# Patient Record
Sex: MALE | Race: AMERICAN INDIAN OR ALASKA NATIVE
[De-identification: names, ages, dates, MRNs, and addresses within clinical notes are randomized per-mention and may not be internally consistent; named-entity substitution may affect disease eponyms.]

---

## 2021-04-22 ENCOUNTER — HOSPITAL ENCOUNTER (INPATIENT)
Dept: HOSPITAL 5 - ED | Age: 21
LOS: 3 days | Discharge: HOME | DRG: 391 | End: 2021-04-25
Attending: INTERNAL MEDICINE | Admitting: HOSPITALIST
Payer: COMMERCIAL

## 2021-04-22 DIAGNOSIS — K52.9: Primary | ICD-10-CM

## 2021-04-22 DIAGNOSIS — N17.9: ICD-10-CM

## 2021-04-22 DIAGNOSIS — Z23: ICD-10-CM

## 2021-04-22 DIAGNOSIS — N18.9: ICD-10-CM

## 2021-04-22 DIAGNOSIS — E87.5: ICD-10-CM

## 2021-04-22 DIAGNOSIS — E10.10: ICD-10-CM

## 2021-04-22 DIAGNOSIS — E87.1: ICD-10-CM

## 2021-04-22 DIAGNOSIS — E86.9: ICD-10-CM

## 2021-04-22 DIAGNOSIS — E86.0: ICD-10-CM

## 2021-04-22 DIAGNOSIS — K29.00: ICD-10-CM

## 2021-04-22 LAB
ALBUMIN SERPL-MCNC: 4.7 G/DL (ref 3.9–5)
ALT SERPL-CCNC: 17 UNITS/L (ref 7–56)
BASOPHILS # (AUTO): 0.1 K/MM3 (ref 0–0.1)
BASOPHILS NFR BLD AUTO: 0.9 % (ref 0–1.8)
BILIRUB UR QL STRIP: (no result)
BLOOD UR QL VISUAL: (no result)
BUN SERPL-MCNC: 21 MG/DL (ref 9–20)
BUN SERPL-MCNC: 22 MG/DL (ref 9–20)
BUN/CREAT SERPL: 12 %
BUN/CREAT SERPL: 12 %
CALCIUM SERPL-MCNC: 10 MG/DL (ref 8.4–10.2)
CALCIUM SERPL-MCNC: 9.9 MG/DL (ref 8.4–10.2)
EOSINOPHIL # BLD AUTO: 0 K/MM3 (ref 0–0.4)
EOSINOPHIL NFR BLD AUTO: 0 % (ref 0–4.3)
HCT VFR BLD CALC: 48.4 % (ref 35.5–45.6)
HEMOLYSIS INDEX: 21
HEMOLYSIS INDEX: 24
HGB BLD-MCNC: 15 GM/DL (ref 11.8–15.2)
LYMPHOCYTES # BLD AUTO: 1.2 K/MM3 (ref 1.2–5.4)
LYMPHOCYTES NFR BLD AUTO: 10.6 % (ref 13.4–35)
MCHC RBC AUTO-ENTMCNC: 31 % (ref 32–34)
MCV RBC AUTO: 86 FL (ref 84–94)
MONOCYTES # (AUTO): 0.3 K/MM3 (ref 0–0.8)
MONOCYTES % (AUTO): 2.9 % (ref 0–7.3)
MUCOUS THREADS #/AREA URNS HPF: (no result) /HPF
PH UR STRIP: 5 [PH] (ref 5–7)
PLATELET # BLD: 215 K/MM3 (ref 140–440)
RBC # BLD AUTO: 5.61 M/MM3 (ref 3.65–5.03)
RBC #/AREA URNS HPF: 1 /HPF (ref 0–6)
UROBILINOGEN UR-MCNC: < 2 MG/DL (ref ?–2)
WBC #/AREA URNS HPF: 1 /HPF (ref 0–6)

## 2021-04-22 PROCEDURE — 96375 TX/PRO/DX INJ NEW DRUG ADDON: CPT

## 2021-04-22 PROCEDURE — 90686 IIV4 VACC NO PRSV 0.5 ML IM: CPT

## 2021-04-22 PROCEDURE — 82962 GLUCOSE BLOOD TEST: CPT

## 2021-04-22 PROCEDURE — 81001 URINALYSIS AUTO W/SCOPE: CPT

## 2021-04-22 PROCEDURE — 84100 ASSAY OF PHOSPHORUS: CPT

## 2021-04-22 PROCEDURE — 83690 ASSAY OF LIPASE: CPT

## 2021-04-22 PROCEDURE — 96368 THER/DIAG CONCURRENT INF: CPT

## 2021-04-22 PROCEDURE — 96376 TX/PRO/DX INJ SAME DRUG ADON: CPT

## 2021-04-22 PROCEDURE — 96372 THER/PROPH/DIAG INJ SC/IM: CPT

## 2021-04-22 PROCEDURE — 83735 ASSAY OF MAGNESIUM: CPT

## 2021-04-22 PROCEDURE — 90732 PPSV23 VACC 2 YRS+ SUBQ/IM: CPT

## 2021-04-22 PROCEDURE — 96361 HYDRATE IV INFUSION ADD-ON: CPT

## 2021-04-22 PROCEDURE — 36415 COLL VENOUS BLD VENIPUNCTURE: CPT

## 2021-04-22 PROCEDURE — 80048 BASIC METABOLIC PNL TOTAL CA: CPT

## 2021-04-22 PROCEDURE — 96366 THER/PROPH/DIAG IV INF ADDON: CPT

## 2021-04-22 PROCEDURE — 96367 TX/PROPH/DG ADDL SEQ IV INF: CPT

## 2021-04-22 PROCEDURE — 83036 HEMOGLOBIN GLYCOSYLATED A1C: CPT

## 2021-04-22 PROCEDURE — 80053 COMPREHEN METABOLIC PANEL: CPT

## 2021-04-22 PROCEDURE — 96365 THER/PROPH/DIAG IV INF INIT: CPT

## 2021-04-22 PROCEDURE — 85025 COMPLETE CBC W/AUTO DIFF WBC: CPT

## 2021-04-22 PROCEDURE — 74177 CT ABD & PELVIS W/CONTRAST: CPT

## 2021-04-22 NOTE — EMERGENCY DEPARTMENT REPORT
ED General Adult HPI





- General


Chief complaint: Hyperglycemia


Stated complaint: DIABETIC/WEAKNESS/DIZZINESS


PUI?: No


Time Seen by Provider: 04/22/21 22:40


Source: patient


Mode of arrival: Ambulatory


Limitations: No Limitations





- History of Present Illness


Initial comments: 





Patient is a 21-year-old male who presents emergency room with complaints of 

elevated blood sugars, abdominal pain, vomiting, dizziness and lightheadedness 

and weakness.  Patient states his sugar "past 2 weeks.  Patient dates he is a 

type I diabetic.  He states he is compliant with his insulin.  Patient states 

that approximately 3 days ago he developed abdominal pain in the epigastric 

region.  Patient states he then started vomiting.  Patient states he not been 

able to hold anything down.  Patient states he is been using ice chips for 

hydration.  Patient states that his dizziness and lightheadedness started today.

 Patient states his symptoms are better with rest and worse with movement and 

vomiting.  Patient denies chest pain.  Patient denies fever.  Patient denies 

shortness of breath.  Patient states the abdominal pain is a 10 out of 10.  Pat

ient states his better with rest and worse with movement and vomiting.  Patient 

denies blood in his vomitus.








Patient denies recent travel.  Patient denies recent international travel.  

Patient denies exposure to the novel coronavirus.  Patient denies sick contacts.

 Patient denies fever and chills.  Patient denies cough.  Patient denies 

diarrhea.  Patient denies coming in contact with anybody with symptoms of the 

novel coronavirus.








-: Sudden


Location: abdomen


Severity scale (0 -10): 10


Quality: stabbing


Consistency: constant


Improves with: rest


Worsens with: movement, other


Associated Symptoms: loss of appetite, malaise, nausea/vomiting, weakness.  

denies: confusion, chest pain, cough, diaphoresis, fever/chills, headaches, 

rash, seizure


Treatments Prior to Arrival: none





- Related Data


                                Home Medications











 Medication  Instructions  Recorded  Confirmed  Last Taken


 


Insulin Lispro [HumaLOG VIAL] 0 units SQ AC 02/13/17 02/13/17 02/13/17








                                  Previous Rx's











 Medication  Instructions  Recorded  Last Taken  Type


 


Insulin Detemir [Levemir VIAL] 25 units SQ DAILY #10 unit 12/08/20 Unknown Rx











                                    Allergies











Allergy/AdvReac Type Severity Reaction Status Date / Time


 


No Known Allergies Allergy   Unverified 02/13/17 08:22














ED Review of Systems


ROS: 


Stated complaint: DIABETIC/WEAKNESS/DIZZINESS


Other details as noted in HPI





Constitutional: see HPI, malaise, weakness.  denies: chills, fever


Eyes: denies: eye pain, eye discharge, vision change


ENT: denies: ear pain, throat pain


Respiratory: denies: cough, shortness of breath, wheezing


Cardiovascular: denies: chest pain, palpitations


Endocrine: no symptoms reported


Gastrointestinal: as per HPI, abdominal pain, nausea, vomiting.  denies: 

diarrhea


Genitourinary: denies: urgency, dysuria


Musculoskeletal: denies: back pain, joint swelling, arthralgia


Skin: denies: rash, lesions


Neurological: denies: headache, weakness, paresthesias


Psychiatric: denies: anxiety, depression


Hematological/Lymphatic: denies: easy bleeding, easy bruising





ED Past Medical Hx





- Past Medical History


Previous Medical History?: Yes


Hx Congestive Heart Failure: No


Hx Diabetes: Yes (Since age 8)


Hx Asthma: No


Hx COPD: No


Additional medical history: Boil





- Surgical History


Past Surgical History?: No





- Family History


Family history: no significant





- Social History


Smoking Status: Never Smoker


Substance Use Type: None





- Medications


Home Medications: 


                                Home Medications











 Medication  Instructions  Recorded  Confirmed  Last Taken  Type


 


Insulin Lispro [HumaLOG VIAL] 0 units SQ AC 02/13/17 02/13/17 02/13/17 History


 


Insulin Detemir [Levemir VIAL] 25 units SQ DAILY #10 unit 12/08/20  Unknown Rx














ED Physical Exam





- General


Limitations: No Limitations


General appearance: alert, in no apparent distress





- Head


Head exam: Present: atraumatic, normocephalic





- Eye


Eye exam: Present: normal appearance





- ENT


ENT exam: Present: mucous membranes dry





- Neck


Neck exam: Present: normal inspection





- Respiratory


Respiratory exam: Present: normal lung sounds bilaterally.  Absent: respiratory 

distress, wheezes, rales





- Cardiovascular


Cardiovascular Exam: Present: regular rate, normal rhythm.  Absent: systolic 

murmur, diastolic murmur, rubs, gallop





- GI/Abdominal


GI/Abdominal exam: Present: soft, tenderness (Generalized tenderness), normal 

bowel sounds





- Rectal


Rectal exam: Present: deferred





- Extremities Exam


Extremities exam: Present: normal inspection





- Back Exam


Back exam: Present: normal inspection





- Neurological Exam


Neurological exam: Present: alert, oriented X3





- Psychiatric


Psychiatric exam: Present: normal affect, normal mood





- Skin


Skin exam: Present: warm, dry, intact, normal color.  Absent: rash





ED Course


                                   Vital Signs











  04/22/21 04/22/21





  21:45 23:15


 


Temperature 97.7 F 


 


Pulse Rate 108 H 


 


Respiratory 18 24





Rate  


 


Blood Pressure 146/85 


 


O2 Sat by Pulse 100 





Oximetry  














- Reevaluation(s)


Reevaluation #1: 


Patient is severely tender on palpation of the abdomen patient will have a CT 

scan of the abdomen with IV contrast.  Patient agrees with plan of care.  

Patient also found to be in DKA and will be placed on DKA protocol to include 

fluids and IV insulin.


04/22/21 22:56











Reevaluation #2: 


Patient states he is feeling a little bit better.  Patient states his dizziness 

is better.


04/23/21 00:02





Reevaluation #3: 


I discussed all results with patient.  I discussed plan of care with patient.  

Patient agrees with plan of care and admission.  Patient to be admitted to the 

hospitalist service.


04/23/21 00:43








- Consultations


Consultation #1: 


Hospitalist consulted for admission.  Hospitalist to admit patient.


04/23/21 00:43








ED Medical Decision Making





- Lab Data


Result diagrams: 


                                 04/22/21 21:51





                                 04/22/21 23:05





- Radiology Data


Radiology results: report reviewed





 


CT ABDOMEN AND PELVIS WITH CONTRAST  


 


 HISTORY: Pt complains of epigastric abd pain w/ weakness x 3 days. High WBC.  


 


 COMPARISON: None.  


 


 TECHNIQUE: CT images of the abdomen and pelvis were obtained following admini

stration of 


intravenous contrast.  All CT scans at this location are performed using CT dose

reduction for ALARA


by means of automated exposure control.   


 


 CONTRAST: 100 ml of intravenous contrast administered.  


 


 FINDINGS:  


 


 Lungs/bones:  Lung bases are clear. No acute osseous abnormality identified.  


 


 Abdomen/pelvis:  The liver, gallbladder, spleen, pancreas, adrenals, kidneys, 

and proximal GI tract


appear unremarkable.  


 


 Prostate is normal with no pelvic free fluid. The appendix is not clearly 

identified on this exam.  


 


 Urinary bladder is distended with no obvious wall thickening, stone disease, or

mass.  


 


 There are areas of the colon which demonstrate questionable mild wall 

thickening (for example the 


proximal and transverse colon). No definite inflammatory stranding and no bowel 

obstruction.  


 


 


 IMPRESSION:  


 1. Question abnormal appearance of the colon. Correlate for potential 

early/mild colitis.  


 2. Distended urinary bladder of uncertain clinical significance with no stone 

disease, wall 


thickening or mass.  


=========================================================================








- Medical Decision Making





Patient is a 21-year-old male presents emergency room with complaints of 

dizziness, lightheadedness, abdominal pain, nausea, vomiting, elevated blood 

sugar.  Patient had labs done which are consistent with renal insufficiency and 

DKA.  Patient had a CT scan of the abdomen to rule out acute abdominal process 

and it was negative for acute findings of her mild colitis.  Patient started on 

DKA protocol immediately after initial evaluation to include insulin drip and 

fluids.  Patient admitted to the hospital service for further evaluation 

treatment.





Critical care time documented due to the multiple reassessments, prolonged time 

at the bedside, interpretation of diagnostics and labs.











- Differential Diagnosis


Abdominal pain, gastritis, colitis, DKA, hyperglycemia


Critical Care Time: Yes


Critical care time in (mins) excluding proc time.: 35


Critical care attestation.: 


If time is entered above; I have spent that time in minutes in the direct care 

of this critically ill patient, excluding procedure time.





Critical Care Time: 





35 MINUTES








ED Disposition


Clinical Impression: 


 Dehydration, Hyponatremia, Hyperkalemia, Dizziness, Colitis, Renal 

insufficiency





DKA (diabetic ketoacidoses)


Qualifiers:


 Diabetes mellitus type: type 1 Diabetes mellitus complication detail: without 

coma Qualified Code(s): E10.10 - Type 1 diabetes mellitus with ketoacidosis 

without coma





Abdominal pain


Qualifiers:


 Abdominal location: epigastric Qualified Code(s): R10.13 - Epigastric pain





Nausea & vomiting


Qualifiers:


 Vomiting type: unspecified Vomiting Intractability: non-intractable Qualified 

Code(s): R11.2 - Nausea with vomiting, unspecified





Gastritis


Qualifiers:


 Gastritis type: unspecified gastritis Chronicity: acute Gastritis bleeding: 

without bleeding Qualified Code(s): K29.00 - Acute gastritis without bleeding





Disposition: DC-09 OP ADMIT IP TO THIS HOSP


Is pt being admited?: Yes


Does the pt Need Aspirin: No


Condition: Critical


Instructions:  Diabetic Ketoacidosis (ED)


Time of Disposition: 00:42

## 2021-04-23 LAB
BUN SERPL-MCNC: 13 MG/DL (ref 9–20)
BUN SERPL-MCNC: 13 MG/DL (ref 9–20)
BUN SERPL-MCNC: 14 MG/DL (ref 9–20)
BUN SERPL-MCNC: 16 MG/DL (ref 9–20)
BUN SERPL-MCNC: 17 MG/DL (ref 9–20)
BUN SERPL-MCNC: 18 MG/DL (ref 9–20)
BUN SERPL-MCNC: 20 MG/DL (ref 9–20)
BUN SERPL-MCNC: 21 MG/DL (ref 9–20)
BUN/CREAT SERPL: 10 %
BUN/CREAT SERPL: 11 %
BUN/CREAT SERPL: 12 %
BUN/CREAT SERPL: 12 %
BUN/CREAT SERPL: 8 %
BUN/CREAT SERPL: 8 %
BUN/CREAT SERPL: 9 %
BUN/CREAT SERPL: 9 %
CALCIUM SERPL-MCNC: 8.1 MG/DL (ref 8.4–10.2)
CALCIUM SERPL-MCNC: 8.2 MG/DL (ref 8.4–10.2)
CALCIUM SERPL-MCNC: 8.3 MG/DL (ref 8.4–10.2)
CALCIUM SERPL-MCNC: 8.3 MG/DL (ref 8.4–10.2)
CALCIUM SERPL-MCNC: 8.6 MG/DL (ref 8.4–10.2)
CALCIUM SERPL-MCNC: 8.7 MG/DL (ref 8.4–10.2)
CALCIUM SERPL-MCNC: 8.8 MG/DL (ref 8.4–10.2)
CALCIUM SERPL-MCNC: 9.2 MG/DL (ref 8.4–10.2)
HEMOLYSIS INDEX: 12
HEMOLYSIS INDEX: 154
HEMOLYSIS INDEX: 23
HEMOLYSIS INDEX: 47
HEMOLYSIS INDEX: 51
HEMOLYSIS INDEX: 66
HEMOLYSIS INDEX: 8
HEMOLYSIS INDEX: 8

## 2021-04-23 RX ADMIN — PANTOPRAZOLE SODIUM SCH MG: 40 TABLET, DELAYED RELEASE ORAL at 08:02

## 2021-04-23 RX ADMIN — HEPARIN SODIUM SCH: 5000 INJECTION, SOLUTION INTRAVENOUS; SUBCUTANEOUS at 06:49

## 2021-04-23 RX ADMIN — DEXTROSE, SODIUM CHLORIDE, AND POTASSIUM CHLORIDE SCH MLS/HR: 5; .45; .15 INJECTION INTRAVENOUS at 06:49

## 2021-04-23 RX ADMIN — HEPARIN SODIUM SCH: 5000 INJECTION, SOLUTION INTRAVENOUS; SUBCUTANEOUS at 20:06

## 2021-04-23 RX ADMIN — DEXTROSE, SODIUM CHLORIDE, AND POTASSIUM CHLORIDE SCH MLS/HR: 5; .45; .15 INJECTION INTRAVENOUS at 17:05

## 2021-04-23 RX ADMIN — PIPERACILLIN AND TAZOBACTAM SCH MLS/HR: 4; .5 INJECTION, POWDER, FOR SOLUTION INTRAVENOUS at 04:03

## 2021-04-23 RX ADMIN — PIPERACILLIN AND TAZOBACTAM SCH MLS/HR: 4; .5 INJECTION, POWDER, FOR SOLUTION INTRAVENOUS at 13:06

## 2021-04-23 RX ADMIN — PIPERACILLIN AND TAZOBACTAM SCH MLS/HR: 4; .5 INJECTION, POWDER, FOR SOLUTION INTRAVENOUS at 19:55

## 2021-04-23 NOTE — CONSULTATION
History of Present Illness





- Reason for Consult


Consult date: 04/23/21


acute renal failure, metabolic acidosis





- History of Present Illness








Mr. Mckee is a 20yo with type I DM dx age 7 who presented to the ED with 

elevated blood sugars.  He reports a 1 day history of nausea, vomiting.  However

per records, patient reported symptoms have been ongoing x 2 weeks.





He denies fevers, chills.  He denies cough, SOB.





In the emergency room patient is found blood glucose is 643 bicarb is 7 and a

nion gap is 47.  





Nephrology consulted due to persistent metabolic acidosis.   Patient denies a 

prior history of kidney disease








Past History


Past Medical History: diabetes





Medications and Allergies


                                    Allergies











Allergy/AdvReac Type Severity Reaction Status Date / Time


 


No Known Allergies Allergy   Unverified 02/13/17 08:22











                                Home Medications











 Medication  Instructions  Recorded  Confirmed  Last Taken  Type


 


Insulin Lispro [HumaLOG VIAL] 0 units SQ AC 02/13/17 02/13/17 02/13/17 History


 


Insulin Detemir [Levemir VIAL] 25 units SQ DAILY #10 unit 12/08/20  Unknown Rx











Active Meds: 


Active Medications





Acetaminophen (Acetaminophen 325 Mg Tab)  650 mg PO Q6H PRN


   PRN Reason: Pain, Mild (1-3)


Dextrose (Dextrose 50% In Water (25gm) 50 Ml Syringe)  50 ml IV Q30MIN PRN; 

Protocol


   PRN Reason: Hypoglycemia


Heparin Sodium (Porcine) (Heparin 5,000 Unit/1 Ml Vial)  5,000 unit SUB-Q Q8HR 

NATY


   Last Admin: 04/23/21 06:49 Dose:  Not Given


   Documented by: 


Hydralazine HCl (Hydralazine 20 Mg/1 Ml Inj)  10 mg IV Q6H PRN


   PRN Reason: htn


Potassium Chloride/Dextrose/Sod Cl (D5w/0.45% Nacl/Kcl 20 Meq)  20 meq in 1,000 

mls @ 125 mls/hr IV AS DIRECT NATY


   Last Admin: 04/23/21 06:49 Dose:  125 mls/hr


   Documented by: 


Insulin Human Regular 100 (units/ Sodium Chloride)  100 mls @ 1 mls/hr IV TITR 

NATY; Protocol


Piperacillin Sod/Tazobactam Sod (Zosyn/Ns 4.5gm/100ml)  4.5 gm in 100 mls @ 200 

mls/hr IV Q8H NATY; Protocol


   Last Admin: 04/23/21 13:06 Dose:  200 mls/hr


   Documented by: 


Lactated Ringer's (Lactated Ringers)  1,000 mls @ 200 mls/hr IV AS DIRECT NATY


Morphine Sulfate (Morphine 2 Mg/1 Ml Inj)  2 mg IV Q4H PRN


   PRN Reason: Pain , Severe (7-10)


Ondansetron HCl (Ondansetron 4 Mg/2 Ml Inj)  4 mg IV Q8H PRN


   PRN Reason: Nausea And Vomiting


Pantoprazole Sodium (Pantoprazole 40 Mg Tab)  40 mg PO QDAC NATY


   Last Admin: 04/23/21 08:02 Dose:  40 mg


   Documented by: 


Sodium Chloride (Sodium Chloride 0.9% 10 Ml Flush Syringe)  10 ml IV PRN PRN


   PRN Reason: LINE FLUSH











Review of Systems


All systems: negative





Exam





- Vital Signs


Vital signs: 


                                   Vital Signs











Temp Pulse Resp BP Pulse Ox


 


 97.7 F   108 H  18   146/85   100 


 


 04/22/21 21:45  04/22/21 21:45  04/22/21 21:45  04/22/21 21:45  04/22/21 21:45














- General Appearance


General appearance: well-developed, well-nourished


EENT: ATNC


Heart: regular, S1S2


Gastrointestinal: Present: normal.  Absent: tenderness, distended


Integumentary: warm and dry


Neurologic: no focal deficit





Results





- Lab Results





                                 04/22/21 21:51





                                 04/23/21 15:43


                             Most recent lab results











Calcium  8.2 mg/dL (8.4-10.2)  L  04/23/21  13:02    


 


Phosphorus  4.30 mg/dL (2.5-4.5)  D 04/23/21  02:45    


 


Magnesium  2.50 mg/dL (1.7-2.3)  H  04/23/21  02:45    














Assessment and Plan





Impression


* Acute kidney injury secondary to volume depletion


   --SCr 0.6mg/dL in Dec 2020


* Anion gap metabolic acidosis secondary to DKA


* DKA


* Type I DM


* Colitis





Plan:


* Case discussed with hospitalist who reported insulin gtt was discontinued by 

  RN due to hypoglycemia.  However, anion gap had not corrected.  Insulin gtt 

  has since been resumed


* Continue IVF for hydration


* Glycemic control per primary team


* Abx per primary team


* Dose medications for renal function


* Avoid potential nephrotoxins


* AM labs

## 2021-04-23 NOTE — HISTORY AND PHYSICAL REPORT
History of Present Illness


Date of examination: 04/23/21


Date of admission: 


04/23/21 00:44





Chief complaint: 





Weakness dizziness hyperglycemia


History of present illness: 





21-year-old male with history of type 1 diabetes was brought to the emergency 

room with complaints of elevated blood sugars, abdominal pain, vomiting, 

dizziness and lightheadedness and weakness for the past 2 weeks.  Patient is 

compliant with his insulin.  Patient states that approximately 3 days ago he 

developed abdominal pain in the epigastric region.  Patient states he then 

started vomiting.  Patient states he not been able to hold anything down.  

Patient states he is been using ice chips for hydration.  Patient states that 

his dizziness and lightheadedness started today.  Patient states his symptoms 

are better with rest and worse with movement and vomiting.  Patient denies chest

pain.  Patient denies fever.  Patient denies shortness of breath.  Patient 

states the abdominal pain is a 10 out of 10.  Patient states his better with 

rest and worse with movement and vomiting.  Patient denies blood in his vomitus.








In the emergency room patient is found to have DKA.  Patient blood glucose is 

643 bicarb is 7 and anion gap is 47, sodium 130 potassium 5.7.  Also patient CT 

scan of the abdomen and pelvis shows colitis





Past History


Past Medical History: diabetes





Medications and Allergies


                                    Allergies











Allergy/AdvReac Type Severity Reaction Status Date / Time


 


No Known Allergies Allergy   Unverified 02/13/17 08:22











                                Home Medications











 Medication  Instructions  Recorded  Confirmed  Last Taken  Type


 


Insulin Lispro [HumaLOG VIAL] 0 units SQ AC 02/13/17 02/13/17 02/13/17 History


 


Insulin Detemir [Levemir VIAL] 25 units SQ DAILY #10 unit 12/08/20  Unknown Rx











Active Meds: 


Active Medications





Dextrose (Dextrose 50% In Water (25gm) 50 Ml Syringe)  50 ml IV Q30MIN PRN; 

Protocol


   PRN Reason: Hypoglycemia


Heparin Sodium (Porcine) (Heparin 5,000 Unit/1 Ml Vial)  5,000 unit SUB-Q Q8HR 

NATY


Hydralazine HCl (Hydralazine 20 Mg/1 Ml Inj)  10 mg IV Q6H PRN


   PRN Reason: htn


Insulin Human Regular 100 (units/ Sodium Chloride)  100 mls @ 1 mls/hr IV TITR 

NATY; Protocol


   Last Admin: 04/23/21 00:13 Dose:  8 units/hr, 8 mls/hr


   Documented by: 


Potassium Chloride/Dextrose/Sod Cl (D5w/0.45% Nacl/Kcl 20 Meq)  20 meq in 1,000 

mls @ 125 mls/hr IV AS DIRECT NATY


Insulin Human Regular 100 (units/ Sodium Chloride)  100 mls @ 1 mls/hr IV TITR 

NATY; Protocol


Piperacillin Sod/Tazobactam Sod (Zosyn/Ns 4.5gm/100ml)  4.5 gm in 100 mls @ 200 

mls/hr IV Q8H NATY; Protocol


Pantoprazole Sodium (Pantoprazole 40 Mg Tab)  40 mg PO QDAC NATY


Sodium Chloride (Sodium Chloride 0.9% 10 Ml Flush Syringe)  10 ml IV PRN PRN


   PRN Reason: LINE FLUSH











Review of Systems


Constitutional: weakness


Cardiovascular: lightheadedness


Gastrointestinal: abdominal pain, nausea, vomiting





Exam





- Constitutional


Vitals: 


                                        











Temp Pulse Resp BP Pulse Ox


 


 97.7 F   120 H  38 H  148/77   100 


 


 04/22/21 21:45  04/23/21 00:45  04/23/21 00:45  04/23/21 00:45  04/23/21 00:45











General appearance: Present: no acute distress, well-nourished





- EENT


Eyes: Present: PERRL


ENT: hearing intact, clear oral mucosa





- Neck


Neck: Present: supple, normal ROM





- Respiratory


Respiratory effort: normal


Respiratory: bilateral: diminished





- Cardiovascular


Heart Sounds: Present: S1 & S2.  Absent: rub, click





- Extremities


Extremities: pulses symmetrical, No edema


Peripheral Pulses: within normal limits





- Abdominal


General gastrointestinal: Present: soft, tender, non-distended, normal bowel 

sounds


Male genitourinary: Present: normal





- Integumentary


Integumentary: Present: clear, warm, dry





- Musculoskeletal


Musculoskeletal: gait normal, strength equal bilaterally





- Psychiatric


Psychiatric: appropriate mood/affect, intact judgment & insight





- Neurologic


Neurologic: CNII-XII intact, moves all extremities





Results





- Labs


CBC & Chem 7: 


                                 04/22/21 21:51





                                 04/22/21 23:05


Labs: 


                             Laboratory Last Values











WBC  11.1 K/mm3 (4.5-11.0)  H  04/22/21  21:51    


 


RBC  5.61 M/mm3 (3.65-5.03)  H  04/22/21  21:51    


 


Hgb  15.0 gm/dl (11.8-15.2)   04/22/21  21:51    


 


Hct  48.4 % (35.5-45.6)  H  04/22/21  21:51    


 


MCV  86 fl (84-94)   04/22/21  21:51    


 


MCH  27 pg (28-32)  L  04/22/21  21:51    


 


MCHC  31 % (32-34)  L  04/22/21  21:51    


 


RDW  12.7 % (13.2-15.2)  L  04/22/21  21:51    


 


Plt Count  215 K/mm3 (140-440)   04/22/21  21:51    


 


Lymph % (Auto)  10.6 % (13.4-35.0)  L  04/22/21  21:51    


 


Mono % (Auto)  2.9 % (0.0-7.3)   04/22/21  21:51    


 


Eos % (Auto)  0.0 % (0.0-4.3)   04/22/21  21:51    


 


Baso % (Auto)  0.9 % (0.0-1.8)   04/22/21  21:51    


 


Lymph # (Auto)  1.2 K/mm3 (1.2-5.4)   04/22/21  21:51    


 


Mono # (Auto)  0.3 K/mm3 (0.0-0.8)   04/22/21  21:51    


 


Eos # (Auto)  0.0 K/mm3 (0.0-0.4)   04/22/21  21:51    


 


Baso # (Auto)  0.1 K/mm3 (0.0-0.1)   04/22/21  21:51    


 


Seg Neutrophils %  85.6 % (40.0-70.0)  H  04/22/21  21:51    


 


Seg Neutrophils #  9.5 K/mm3 (1.8-7.7)  H  04/22/21  21:51    


 


Sodium  130 mmol/L (137-145)  L  04/22/21  23:05    


 


Potassium  5.7 mmol/L (3.6-5.0)  H  04/22/21  23:05    


 


Chloride  81.7 mmol/L ()  L  04/22/21  23:05    


 


Carbon Dioxide  7 mmol/L (22-30)  L*  04/22/21  23:05    


 


Anion Gap  47 mmol/L  04/22/21  23:05    


 


BUN  22 mg/dL (9-20)  H  04/22/21  23:05    


 


Creatinine  1.9 mg/dL (0.8-1.3)  H  04/22/21  23:05    


 


Estimated GFR  > 60 ml/min  04/23/21  01:30    


 


BUN/Creatinine Ratio  12 %  04/23/21  01:30    


 


Glucose  643 mg/dL ()  H*  04/22/21  23:05    


 


POC Glucose  > 600 mg/dL ()  H  04/22/21  21:59    


 


Calcium  9.9 mg/dL (8.4-10.2)   04/22/21  23:05    


 


Phosphorus  7.80 mg/dL (2.5-4.5)  H  04/22/21  23:05    


 


Magnesium  2.40 mg/dL (1.7-2.3)  H  04/22/21  23:05    


 


Total Bilirubin  0.40 mg/dL (0.1-1.2)   04/22/21  21:51    


 


AST  23 units/L (5-40)   04/22/21  21:51    


 


ALT  17 units/L (7-56)   04/22/21  21:51    


 


Alkaline Phosphatase  151 units/L ()  H  04/22/21  21:51    


 


Total Protein  7.2 g/dL (6.3-8.2)   04/22/21  21:51    


 


Albumin  4.7 g/dL (3.9-5)   04/22/21  21:51    


 


Albumin/Globulin Ratio  1.9 %  04/22/21  21:51    


 


Lipase  24 units/L (13-60)   04/22/21  21:51    


 


Urine Color  Straw  (Yellow)   04/22/21  Unknown


 


Urine Turbidity  Clear  (Clear)   04/22/21  Unknown


 


Urine pH  5.0  (5.0-7.0)   04/22/21  Unknown


 


Ur Specific Gravity  1.021  (1.003-1.030)   04/22/21  Unknown


 


Urine Protein  100 mg/dl mg/dL (Negative)   04/22/21  Unknown


 


Urine Glucose (UA)  >=500 mg/dL (Negative)   04/22/21  Unknown


 


Urine Ketones  80 mg/dL (Negative)   04/22/21  Unknown


 


Urine Blood  Neg  (Negative)   04/22/21  Unknown


 


Urine Nitrite  Neg  (Negative)   04/22/21  Unknown


 


Urine Bilirubin  Neg  (Negative)   04/22/21  Unknown


 


Urine Urobilinogen  < 2.0 mg/dL (<2.0)   04/22/21  Unknown


 


Ur Leukocyte Esterase  Neg  (Negative)   04/22/21  Unknown


 


Urine WBC (Auto)  1.0 /HPF (0.0-6.0)   04/22/21  Unknown


 


Urine RBC (Auto)  1.0 /HPF (0.0-6.0)   04/22/21  Unknown


 


Urine Mucus  Few /HPF  04/22/21  Unknown














- Imaging and Cardiology


CT scan - abdomen: image reviewed





Assessment and Plan


VTE prophylaxis?: Chemical


Plan of care discussed with patient/family: Yes





- Patient Problems


(1) DKA (diabetic ketoacidoses)


Current Visit: Yes   Status: Acute   


Qualifiers: 


   Diabetes mellitus type: type 1   Diabetes mellitus complication detail: 

without coma   Qualified Code(s): E10.10 - Type 1 diabetes mellitus with 

ketoacidosis without coma   


Plan to address problem: 


Admit the patient to the ICU.  Put the patient on DKA pathway.  IV fluid as per 

DKA protocol.  Insulin drip as per DKA protocol.  We do the serial BMP.








(2) Colitis


Current Visit: Yes   Status: Acute   


Plan to address problem: 


Zosyn 4.5 g IV every 8 hours.  Tylenol 650 mg p.o. every 6 hours as needed.  

Protonix 40 mg p.o. daily.  Zofran 4 mg IV every 6 hours as needed








(3) Abdominal pain


Current Visit: Yes   Status: Acute   


Qualifiers: 


   Abdominal location: epigastric   Qualified Code(s): R10.13 - Epigastric pain 

 


Plan to address problem: 


Tylenol 650 mg p.o. every 6 hours as needed.  Morphine 2 mg IV every 4 hours as 

needed.  Protonix 40 mg p.o. daily.  If needed will consult GI








(4) Hyponatremia


Current Visit: Yes   Status: Acute   


Plan to address problem: 


Patient is on normal saline as per DKA protocol.  We will recheck the BMP in the

morning.








(5) Nausea & vomiting


Current Visit: Yes   Status: Acute   


Qualifiers: 


   Vomiting type: unspecified   Vomiting Intractability: non-intractable   

Qualified Code(s): R11.2 - Nausea with vomiting, unspecified   


Plan to address problem: 


Protonix 40 mg p.o. daily.  Zofran 4 mg IV every 6 hours as needed.  IV fluid as

per DKA protocol.  We will monitor the patient closely








(6) Renal insufficiency


Current Visit: Yes   Status: Acute   


Plan to address problem: 


Patient is on normal saline as per DKA protocol.  Will avoid the nephrotoxic 

drug.  Recheck BMP in the morning.  If needed will consult nephrology in the 

morning








(7) DVT prophylaxis


Current Visit: No   Status: Acute   


Plan to address problem: 


Heparin 5000 units subcu every 8 hours for DVT prophylaxis.  Protonix 40 mg p.o.

daily for GI prophylaxis.  Patient is a full code

## 2021-04-23 NOTE — EVENT NOTE
Date: 04/23/21





Patient seen and evaluated at bedside by 9am


Complains of stomach pain.


Insulin was discontinued by night shift RN


Day shift RN instructed to resume insulin drip immediately


Will continue to monitor closely


BMPx2-4 hrs, POCT q1h








------------------------


12:30PM


Reviewed BMP - HCO3 is 6. Critical value not relayed to me.


Evaluated patient at bedside. He is awake and alert. IV infusion not flowing. 

Informed RN


Ordered 2 amps bicarbonate injections- Informed RN about adjusting IV site


STAT BMP ordered


Last glucose level is 290. Switched fluids to LR. 


Consulted nephrology for metabolic acidosis

## 2021-04-23 NOTE — CAT SCAN REPORT
CT ABDOMEN AND PELVIS WITH CONTRAST



HISTORY: Pt complains of epigastric abd pain w/ weakness x 3 days. High WBC.



COMPARISON: None.



TECHNIQUE: CT images of the abdomen and pelvis were obtained following administration of intravenous 
contrast.  All CT scans at this location are performed using CT dose reduction for ALARA by means of 
automated exposure control. 



CONTRAST: 100 ml of intravenous contrast administered.



FINDINGS:



Lungs/bones:  Lung bases are clear. No acute osseous abnormality identified.



Abdomen/pelvis:  The liver, gallbladder, spleen, pancreas, adrenals, kidneys, and proximal GI tract a
ppear unremarkable.



Prostate is normal with no pelvic free fluid. The appendix is not clearly identified on this exam.



Urinary bladder is distended with no obvious wall thickening, stone disease, or mass.



There are areas of the colon which demonstrate questionable mild wall thickening (for example the pro
ximal and transverse colon). No definite inflammatory stranding and no bowel obstruction.





IMPRESSION:

1. Question abnormal appearance of the colon. Correlate for potential early/mild colitis.

2. Distended urinary bladder of uncertain clinical significance with no stone disease, wall thickenin
g or mass.



Signer Name: Jd Hobbs MD 

Signed: 4/23/2021 12:15 AM

Workstation Name: [id number]

## 2021-04-24 LAB
BUN SERPL-MCNC: 11 MG/DL (ref 9–20)
BUN SERPL-MCNC: 12 MG/DL (ref 9–20)
BUN/CREAT SERPL: 8 %
BUN/CREAT SERPL: 8 %
CALCIUM SERPL-MCNC: 8.3 MG/DL (ref 8.4–10.2)
CALCIUM SERPL-MCNC: 8.4 MG/DL (ref 8.4–10.2)
HEMOLYSIS INDEX: 17
HEMOLYSIS INDEX: 4

## 2021-04-24 RX ADMIN — PIPERACILLIN AND TAZOBACTAM SCH MLS/HR: 4; .5 INJECTION, POWDER, FOR SOLUTION INTRAVENOUS at 04:11

## 2021-04-24 RX ADMIN — INSULIN LISPRO SCH UNIT: 100 INJECTION, SOLUTION INTRAVENOUS; SUBCUTANEOUS at 13:06

## 2021-04-24 RX ADMIN — HEPARIN SODIUM SCH UNIT: 5000 INJECTION, SOLUTION INTRAVENOUS; SUBCUTANEOUS at 21:49

## 2021-04-24 RX ADMIN — HEPARIN SODIUM SCH: 5000 INJECTION, SOLUTION INTRAVENOUS; SUBCUTANEOUS at 14:50

## 2021-04-24 RX ADMIN — INSULIN LISPRO SCH UNIT: 100 INJECTION, SOLUTION INTRAVENOUS; SUBCUTANEOUS at 10:41

## 2021-04-24 RX ADMIN — AMOXICILLIN AND CLAVULANATE POTASSIUM SCH EACH: 875; 125 TABLET, FILM COATED ORAL at 10:42

## 2021-04-24 RX ADMIN — HEPARIN SODIUM SCH: 5000 INJECTION, SOLUTION INTRAVENOUS; SUBCUTANEOUS at 02:51

## 2021-04-24 RX ADMIN — AMOXICILLIN AND CLAVULANATE POTASSIUM SCH EACH: 875; 125 TABLET, FILM COATED ORAL at 21:49

## 2021-04-24 RX ADMIN — PANTOPRAZOLE SODIUM SCH MG: 40 TABLET, DELAYED RELEASE ORAL at 10:41

## 2021-04-24 RX ADMIN — INSULIN LISPRO SCH UNIT: 100 INJECTION, SOLUTION INTRAVENOUS; SUBCUTANEOUS at 16:45

## 2021-04-24 RX ADMIN — HEPARIN SODIUM SCH: 5000 INJECTION, SOLUTION INTRAVENOUS; SUBCUTANEOUS at 07:24

## 2021-04-24 RX ADMIN — INSULIN LISPRO SCH UNIT: 100 INJECTION, SOLUTION INTRAVENOUS; SUBCUTANEOUS at 21:50

## 2021-04-24 NOTE — PROGRESS NOTE
Assessment and Plan


Assessment and plan: 





#DKA


Resolved


Started on Lantus and a sliding scale


Stop IV insulin 2 hours after giving Lantus








#Mild colitis


On Zosyn-switch to p.o. Augmentin





#Hyponatremia


Resolved





#CKD


Avoid nephrotoxic medications


Monitor renal function





#DVT prophylaxis-Lovenox








History


Interval history: 





Patient seen and examined at bedside this morning.


Anion gap is closed so IV insulin will be discontinued


Patient started on Lantus and sliding scale





Hospitalist Physical





- Physical exam


Narrative exam: 





VITAL SIGNS:  Reviewed.    


GENERAL: Awake


HEAD:  No signs of head trauma.


EYES:  Pupils are equal.  Extraocular motions intact.  


MOUTH:  Oropharynx is normal. 


NECK:  No adenopathy, no JVD.  


CHEST:  Chest with diminished breath sounds bilaterally.  No wheezes, rales, or 

rhonchi.  


CARDIAC:  normal S1 and S2, without murmurs, gallops, or rubs.


ABDOMEN:  Soft, non tender and non distended.  No   rebound or guarding, and no 

masses palpated.   Bowel Sounds normal.


MUSCULOSKELETAL:  No edema


NEUROLOGIC EXAM: Alert and oriented x3.  No focal neurologic deficits


SKIN: No obvious lesions








- Constitutional


Vitals: 


                                        











Temp Pulse Resp BP Pulse Ox


 


 98.2 F   80   12   113/72   98 


 


 04/23/21 07:57  04/24/21 07:00  04/24/21 07:00  04/24/21 07:00  04/24/21 07:00














Results





- Labs


CBC & Chem 7: 


                                 04/22/21 21:51





                                 04/24/21 05:14


Labs: 


                             Laboratory Last Values











WBC  11.1 K/mm3 (4.5-11.0)  H  04/22/21  21:51    


 


RBC  5.61 M/mm3 (3.65-5.03)  H  04/22/21  21:51    


 


Hgb  15.0 gm/dl (11.8-15.2)   04/22/21  21:51    


 


Hct  48.4 % (35.5-45.6)  H  04/22/21  21:51    


 


MCV  86 fl (84-94)   04/22/21  21:51    


 


MCH  27 pg (28-32)  L  04/22/21  21:51    


 


MCHC  31 % (32-34)  L  04/22/21  21:51    


 


RDW  12.7 % (13.2-15.2)  L  04/22/21  21:51    


 


Plt Count  215 K/mm3 (140-440)   04/22/21  21:51    


 


Lymph % (Auto)  10.6 % (13.4-35.0)  L  04/22/21  21:51    


 


Mono % (Auto)  2.9 % (0.0-7.3)   04/22/21  21:51    


 


Eos % (Auto)  0.0 % (0.0-4.3)   04/22/21  21:51    


 


Baso % (Auto)  0.9 % (0.0-1.8)   04/22/21  21:51    


 


Lymph # (Auto)  1.2 K/mm3 (1.2-5.4)   04/22/21  21:51    


 


Mono # (Auto)  0.3 K/mm3 (0.0-0.8)   04/22/21  21:51    


 


Eos # (Auto)  0.0 K/mm3 (0.0-0.4)   04/22/21  21:51    


 


Baso # (Auto)  0.1 K/mm3 (0.0-0.1)   04/22/21  21:51    


 


Seg Neutrophils %  85.6 % (40.0-70.0)  H  04/22/21  21:51    


 


Seg Neutrophils #  9.5 K/mm3 (1.8-7.7)  H  04/22/21  21:51    


 


Sodium  134 mmol/L (137-145)  L  04/24/21  05:14    


 


Potassium  3.4 mmol/L (3.6-5.0)  L  04/24/21  05:14    


 


Chloride  101.5 mmol/L ()   04/24/21  05:14    


 


Carbon Dioxide  20 mmol/L (22-30)  L  04/24/21  05:14    


 


Anion Gap  16 mmol/L  04/24/21  05:14    


 


BUN  11 mg/dL (9-20)   04/24/21  05:14    


 


Creatinine  1.4 mg/dL (0.8-1.3)  H  04/24/21  05:14    


 


Estimated GFR  > 60 ml/min  04/24/21  05:14    


 


BUN/Creatinine Ratio  8 %  04/24/21  05:14    


 


Glucose  169 mg/dL ()  H  04/24/21  05:14    


 


POC Glucose  123 mg/dL ()  H  04/24/21  07:52    


 


Calcium  8.3 mg/dL (8.4-10.2)  L  04/24/21  05:14    


 


Phosphorus  4.30 mg/dL (2.5-4.5)  D 04/23/21  02:45    


 


Magnesium  2.50 mg/dL (1.7-2.3)  H  04/23/21  02:45    


 


Total Bilirubin  0.40 mg/dL (0.1-1.2)   04/22/21  21:51    


 


AST  23 units/L (5-40)   04/22/21  21:51    


 


ALT  17 units/L (7-56)   04/22/21  21:51    


 


Alkaline Phosphatase  151 units/L ()  H  04/22/21  21:51    


 


Total Protein  7.2 g/dL (6.3-8.2)   04/22/21  21:51    


 


Albumin  4.7 g/dL (3.9-5)   04/22/21  21:51    


 


Albumin/Globulin Ratio  1.9 %  04/22/21  21:51    


 


Lipase  24 units/L (13-60)   04/22/21  21:51    


 


Urine Color  Straw  (Yellow)   04/22/21  Unknown


 


Urine Turbidity  Clear  (Clear)   04/22/21  Unknown


 


Urine pH  5.0  (5.0-7.0)   04/22/21  Unknown


 


Ur Specific Gravity  1.021  (1.003-1.030)   04/22/21  Unknown


 


Urine Protein  100 mg/dl mg/dL (Negative)   04/22/21  Unknown


 


Urine Glucose (UA)  >=500 mg/dL (Negative)   04/22/21  Unknown


 


Urine Ketones  80 mg/dL (Negative)   04/22/21  Unknown


 


Urine Blood  Neg  (Negative)   04/22/21  Unknown


 


Urine Nitrite  Neg  (Negative)   04/22/21  Unknown


 


Urine Bilirubin  Neg  (Negative)   04/22/21  Unknown


 


Urine Urobilinogen  < 2.0 mg/dL (<2.0)   04/22/21  Unknown


 


Ur Leukocyte Esterase  Neg  (Negative)   04/22/21  Unknown


 


Urine WBC (Auto)  1.0 /HPF (0.0-6.0)   04/22/21  Unknown


 


Urine RBC (Auto)  1.0 /HPF (0.0-6.0)   04/22/21  Unknown


 


Urine Mucus  Few /HPF  04/22/21  Unknown














Active Medications





- Current Medications


Current Medications: 














Generic Name Dose Route Start Last Admin





  Trade Name Freq  PRN Reason Stop Dose Admin


 


Acetaminophen  650 mg  04/23/21 02:36 





  Acetaminophen 325 Mg Tab  PO  





  Q6H PRN  





  Pain, Mild (1-3)  


 


Dextrose  50 ml  04/22/21 22:55 





  Dextrose 50% In Water (25gm) 50 Ml Syringe  IV  





  Q30MIN PRN  





  Hypoglycemia  





  Protocol  


 


Heparin Sodium (Porcine)  5,000 unit  04/23/21 06:00  04/24/21 07:24





  Heparin 5,000 Unit/1 Ml Vial  SUB-Q   Not Given





  Q8HR NATY  


 


Hydralazine HCl  10 mg  04/23/21 02:25 





  Hydralazine 20 Mg/1 Ml Inj  IV  





  Q6H PRN  





  htn  


 


Insulin Human Regular 100  100 mls @ 1 mls/hr  04/23/21 03:00  04/24/21 06:21





  units/ Sodium Chloride  IV   4 units/hr





  TITR NATY   4 mls/hr





    Administration





  Protocol  





  1 UNITS/HR  


 


Piperacillin Sod/Tazobactam Sod  4.5 gm in 100 mls @ 200 mls/hr  04/23/21 03:00 

04/24/21 04:11





  Zosyn/Ns 4.5gm/100ml  IV   200 mls/hr





  Q8H NATY   Administration





  Protocol  


 


Insulin Glargine  25 units  04/24/21 09:09 





  Insulin Glargine 100 Units/Ml  SUB-Q  





  QAMDIAB NATY  


 


Insulin Human Lispro  0 unit  04/24/21 08:22 





  Insulin Lispro 100 Unit/Ml  SUB-Q  





  ACHS NATY  





  Protocol  


 


Morphine Sulfate  2 mg  04/23/21 02:37 





  Morphine 2 Mg/1 Ml Inj  IV  





  Q4H PRN  





  Pain , Severe (7-10)  


 


Ondansetron HCl  4 mg  04/23/21 02:36 





  Ondansetron 4 Mg/2 Ml Inj  IV  





  Q8H PRN  





  Nausea And Vomiting  


 


Pantoprazole Sodium  40 mg  04/23/21 07:30  04/23/21 08:02





  Pantoprazole 40 Mg Tab  PO   40 mg





  QDAC NATY   Administration


 


Sodium Chloride  10 ml  04/22/21 23:00 





  Sodium Chloride 0.9% 10 Ml Flush Syringe  IV  





  PRN PRN  





  LINE FLUSH  














Nutrition/Malnutrition Assess





- Dietary Evaluation


Nutrition/Malnutrition Findings: 


                                        





Nutrition Notes                                            Start:  04/23/21 

13:24


Freq:                                                      Status: Active       




Protocol:                                                                       




 Document     04/23/21 13:24  CW  (Rec: 04/23/21 13:29  CW  [id number])


 Nutrition Notes


     Need for Assessment generated from:         MD Order,Education


     Initial or Follow up                        Brief Note


     Current Diagnosis                           Acute Kidney Injury,Diabetes


     Other Pertinent Diagnosis                   DKA


     Current Diet                                No current Diet


     Labs/Tests                                   on adm


                                                 HgbA1c 13.6 on 12/08/2020


     Intake Prior to Admission                   Poor


     Weight Status                               Appropriate


     Subjective/Other Information                MD consult for DKA. Pt remains


                                                 on ED hold. Chart reports


                                                 that pt is compliant with


                                                 insulin medication.


     Burn                                        Absent


     Trauma                                      Absent


     GI Symptoms                                 Nausea,Vomiting


     Current % PO                                Negligible


 Nutrition Intervention


     Change Diet Order:                          Change diet to Consistent


                                                 Carbohydrate diet when


                                                 medically feasible


     Goal #1                                     Understand importance of


                                                 following a consistent


                                                 carbohydrate diet


     Anticipated Discharge Needs:                Consistent Carbohydrate Diet


     Follow-Up By:                               04/26/21


     Additional Comments                         F/U diet education and intakes


                                                 .

## 2021-04-24 NOTE — PROGRESS NOTE
Assessment and Plan





Impression


* Acute kidney injury secondary to volume depletion


   --SCr 0.6mg/dL in Dec 2020


* Anion gap metabolic acidosis secondary to DKA


* DKA


* Type I DM


* Colitis





Plan:


* Renal function improved.  Acidosis correcting, anion gap closed


* Continue IVF


* Glycemic control per primary team


* Abx per primary team


* Dose medications for renal function


* Avoid potential nephrotoxins


* AM labs





Subjective


Date of service: 04/24/21


Interval history: 





Patient is off the floor at time of visit.





Objective





- Exam


Narrative Exam: 





Deferred





- Vital Signs


Vital signs: 


                               Vital Signs - 12hr











  04/24/21 04/24/21 04/24/21





  06:00 07:00 08:00


 


Pulse Rate 79 80 


 


Respiratory 13 12 12





Rate   


 


Blood Pressure 115/74 113/72 100/68


 


O2 Sat by Pulse 98 98 100





Oximetry   














  04/24/21 04/24/21 04/24/21





  09:00 10:00 11:00


 


Pulse Rate   


 


Respiratory 13  





Rate   


 


Blood Pressure 127/74 111/63 122/86


 


O2 Sat by Pulse 100 100 100





Oximetry   














  04/24/21 04/24/21 04/24/21





  12:00 13:00 14:00


 


Pulse Rate   


 


Respiratory   





Rate   


 


Blood Pressure 139/89 107/71 116/79


 


O2 Sat by Pulse 100 100 100





Oximetry   














  04/24/21 04/24/21





  15:00 16:00


 


Pulse Rate  


 


Respiratory  





Rate  


 


Blood Pressure 101/60 117/77


 


O2 Sat by Pulse 99 100





Oximetry  














- Lab





                                 04/22/21 21:51





                                 04/24/21 05:14


                             Most recent lab results











Calcium  8.3 mg/dL (8.4-10.2)  L  04/24/21  05:14    


 


Phosphorus  4.30 mg/dL (2.5-4.5)  D 04/23/21  02:45    


 


Magnesium  2.50 mg/dL (1.7-2.3)  H  04/23/21  02:45    














Medications & Allergies





- Medications


Allergies/Adverse Reactions: 


                                    Allergies





No Known Allergies Allergy (Unverified 02/13/17 08:22)


   








Home Medications: 


                                Home Medications











 Medication  Instructions  Recorded  Confirmed  Last Taken  Type


 


Insulin Lispro [HumaLOG VIAL] 0 units SQ AC 02/13/17 04/24/21 02/13/17 History


 


Insulin Detemir [Levemir VIAL] 25 units SQ DAILY #10 unit 12/08/20 04/24/21 

Unknown Rx











Active Medications: 














Generic Name Dose Route Start Last Admin





  Trade Name Freq  PRN Reason Stop Dose Admin


 


Acetaminophen  650 mg  04/23/21 02:36 





  Acetaminophen 325 Mg Tab  PO  





  Q6H PRN  





  Pain, Mild (1-3)  


 


Amoxicillin/Clavulanate Potassium  1 each  04/24/21 10:00  04/24/21 10:42





  Amoxicillin/K Clav 875/125mg Tab  PO  04/29/21 00:00  1 each





  Q12HR NATY   Administration





  Protocol  


 


Dextrose  50 ml  04/22/21 22:55 





  Dextrose 50% In Water (25gm) 50 Ml Syringe  IV  





  Q30MIN PRN  





  Hypoglycemia  





  Protocol  


 


Heparin Sodium (Porcine)  5,000 unit  04/23/21 06:00  04/24/21 14:50





  Heparin 5,000 Unit/1 Ml Vial  SUB-Q   Not Given





  Q8HR NATY  


 


Hydralazine HCl  10 mg  04/23/21 02:25 





  Hydralazine 20 Mg/1 Ml Inj  IV  





  Q6H PRN  





  htn  


 


Insulin Glargine  25 units  04/24/21 09:09  04/24/21 10:41





  Insulin Glargine 100 Units/Ml  SUB-Q   25 units





  QAMDIAB NATY   Administration


 


Insulin Human Lispro  0 unit  04/24/21 08:22  04/24/21 16:45





  Insulin Lispro 100 Unit/Ml  SUB-Q   4 unit





  ACHS NATY   Administration





  Protocol  


 


Morphine Sulfate  2 mg  04/23/21 02:37 





  Morphine 2 Mg/1 Ml Inj  IV  





  Q4H PRN  





  Pain , Severe (7-10)  


 


Ondansetron HCl  4 mg  04/23/21 02:36 





  Ondansetron 4 Mg/2 Ml Inj  IV  





  Q8H PRN  





  Nausea And Vomiting  


 


Pantoprazole Sodium  40 mg  04/23/21 07:30  04/24/21 10:41





  Pantoprazole 40 Mg Tab  PO   40 mg





  QDAC NATY   Administration


 


Sodium Chloride  10 ml  04/22/21 23:00 





  Sodium Chloride 0.9% 10 Ml Flush Syringe  IV  





  PRN PRN  





  LINE FLUSH

## 2021-04-25 VITALS — DIASTOLIC BLOOD PRESSURE: 83 MMHG | SYSTOLIC BLOOD PRESSURE: 122 MMHG

## 2021-04-25 LAB
ALBUMIN SERPL-MCNC: 3.3 G/DL (ref 3.9–5)
ALT SERPL-CCNC: 10 UNITS/L (ref 7–56)
BUN SERPL-MCNC: 7 MG/DL (ref 9–20)
BUN/CREAT SERPL: 8 %
CALCIUM SERPL-MCNC: 8.4 MG/DL (ref 8.4–10.2)
HEMOLYSIS INDEX: 29

## 2021-04-25 PROCEDURE — 3E0234Z INTRODUCTION OF SERUM, TOXOID AND VACCINE INTO MUSCLE, PERCUTANEOUS APPROACH: ICD-10-PCS | Performed by: HOSPITALIST

## 2021-04-25 RX ADMIN — INSULIN LISPRO SCH: 100 INJECTION, SOLUTION INTRAVENOUS; SUBCUTANEOUS at 12:23

## 2021-04-25 RX ADMIN — PANTOPRAZOLE SODIUM SCH MG: 40 TABLET, DELAYED RELEASE ORAL at 08:31

## 2021-04-25 RX ADMIN — HEPARIN SODIUM SCH UNIT: 5000 INJECTION, SOLUTION INTRAVENOUS; SUBCUTANEOUS at 06:23

## 2021-04-25 RX ADMIN — INSULIN LISPRO SCH UNIT: 100 INJECTION, SOLUTION INTRAVENOUS; SUBCUTANEOUS at 08:30

## 2021-04-25 RX ADMIN — AMOXICILLIN AND CLAVULANATE POTASSIUM SCH EACH: 875; 125 TABLET, FILM COATED ORAL at 09:59

## 2021-04-25 NOTE — DISCHARGE SUMMARY
Providers





- Providers


Date of Admission: 


04/23/21 00:44





Date of discharge: 04/25/21


Attending physician: 


MIRANDA MANCERA





                                        





04/23/21 02:24


Consult to Dietitian/Nutrition [CONS] Routine 


   Physician Instructions: 


   Reason For Exam: DKA


   Reason for Consult: Nutrition Recommendations


   Reason for Consult: Diet education





04/23/21 12:19


Consult to Physician [CONS] Stat 


   Comment: 


   Consulting Provider: EARL COELLO


   Physician Instructions: 


   Reason For Exam: Metabolic acidosis











Primary care physician: 


PRIMARY CARE MD








Hospitalization


Condition: Critical


Hospital course: 


21-year-old male with history of type 1 diabetes was brought to the emergency 

room with complaints of elevated blood sugars, abdominal pain, vomiting, 

dizziness and lightheadedness and weakness for the past 2 weeks.  Patient is 

compliant with his insulin.  Patient states that approximately 3 days ago he 

developed abdominal pain in the epigastric region.  Patient states he then 

started vomiting.  Patient states he not been able to hold anything down.  

Patient states he is been using ice chips for hydration.  Patient states that 

his dizziness and lightheadedness started today.  Patient states his symptoms 

are better with rest and worse with movement and vomiting.  Patient denies chest

 pain.  Patient denies fever.  Patient denies shortness of breath.  Patient sta

agnes the abdominal pain is a 10 out of 10.  Patient states his better with rest 

and worse with movement and vomiting.  Patient denies blood in his vomitus.








In the emergency room patient is found to have DKA.  Patient blood glucose is 

643 bicarb is 7 and anion gap is 47, sodium 130 potassium 5.7.  Also patient CT 

scan of the abdomen and pelvis shows colitis





He was admitted and started on insulin  drip. His blood glucose and anion gap 

closed so insulin drip was switched to lantus and lispro. His blood glucose has 

remained stable. His hemoglobin A1c is 12.7. He will be discharged to continue 

insulin and complete antibiotics for colitis. He will follow up with a primary 

medical doctor for repeat labs in 1 week





Disposition: DC-01 TO HOME OR SELFCARE


Final Discharge Diagnosis (Prints w/discharge instructions): DKA


Time spent for discharge: 40 mins





- Discharge Diagnoses


(1) Colitis


Status: Acute   





(2) DKA (diabetic ketoacidoses)


Status: Acute   


Qualifiers: 


   Diabetes mellitus type: type 1   Diabetes mellitus complication detail: 

without coma   Qualified Code(s): E10.10 - Type 1 diabetes mellitus with 

ketoacidosis without coma   





Core Measure Documentation





- Palliative Care


Palliative Care/ Comfort Measures: Not Applicable





- Core Measures


Any of the following diagnoses?: none





Exam





- Physical Exam


Narrative exam: 





VITAL SIGNS:  Reviewed.    


GENERAL: Awake


HEAD:  No signs of head trauma.


EYES:  Pupils are equal.  Extraocular motions intact.  


MOUTH:  Oropharynx is normal. 


NECK:  No adenopathy, no JVD.  


CHEST:  Chest with diminished breath sounds bilaterally.  No wheezes, rales, or 

rhonchi.  


CARDIAC:  normal S1 and S2, without murmurs, gallops, or rubs.


ABDOMEN:  Soft, non tender and non distended.  No   rebound or guarding, and no 

masses palpated.   Bowel Sounds normal.


MUSCULOSKELETAL:  No edema


NEUROLOGIC EXAM: Alert and oriented x3.  No focal neurologic deficits


SKIN: No obvious lesions








- Constitutional


Vitals: 


                                        











Temp Pulse Resp BP Pulse Ox


 


 98.6 F   91 H  18   122/83   100 


 


 04/25/21 05:04  04/25/21 05:04  04/25/21 05:04  04/25/21 05:04  04/25/21 05:04














Plan


Diet: diabetic


Additional Instructions: Complete antibiotics.  Continue insulin regimen.  

Follow up with your PCP in a week for repeat labs


Follow up with: 


PRIMARY CARE,MD [Primary Care Provider] - 3-5 Days


Prescriptions: 


Amoxicillin/K Clav Tab [Augmentin 875MG TAB] 1 each PO Q12HR #10 tablet


Lispro Insulin [HumaLOG] 8 unit SUB-Q AC #10 ml


Insulin Glargine [Lantus VIAL] 30 units SUB-Q QAMDIAB #10 units

## 2022-06-29 ENCOUNTER — HOSPITAL ENCOUNTER (INPATIENT)
Dept: HOSPITAL 5 - ED | Age: 22
LOS: 2 days | Discharge: HOME | DRG: 637 | End: 2022-07-01
Attending: STUDENT IN AN ORGANIZED HEALTH CARE EDUCATION/TRAINING PROGRAM | Admitting: HOSPITALIST
Payer: COMMERCIAL

## 2022-06-29 DIAGNOSIS — E87.5: ICD-10-CM

## 2022-06-29 DIAGNOSIS — E11.10: Primary | ICD-10-CM

## 2022-06-29 DIAGNOSIS — N17.0: ICD-10-CM

## 2022-06-29 DIAGNOSIS — E86.0: ICD-10-CM

## 2022-06-29 DIAGNOSIS — Z83.3: ICD-10-CM

## 2022-06-29 DIAGNOSIS — K29.70: ICD-10-CM

## 2022-06-29 DIAGNOSIS — E87.1: ICD-10-CM

## 2022-06-29 LAB
ALBUMIN SERPL-MCNC: 4.4 G/DL (ref 3.9–5)
ALT SERPL-CCNC: 409 UNITS/L (ref 7–56)
BACTERIA #/AREA URNS HPF: (no result) /HPF
BASOPHILS # (AUTO): 0.1 K/MM3 (ref 0–0.1)
BASOPHILS NFR BLD AUTO: 2.1 % (ref 0–1.8)
BILIRUB UR QL STRIP: (no result)
BLOOD UR QL VISUAL: (no result)
BUN SERPL-MCNC: 17 MG/DL (ref 9–20)
BUN/CREAT SERPL: 9 %
CALCIUM SERPL-MCNC: 9.1 MG/DL (ref 8.4–10.2)
EOSINOPHIL # BLD AUTO: 0 K/MM3 (ref 0–0.4)
EOSINOPHIL NFR BLD AUTO: 0.1 % (ref 0–4.3)
HCT VFR BLD CALC: 44.7 % (ref 35.5–45.6)
HEMOLYSIS INDEX: 44
HGB BLD-MCNC: 13.9 GM/DL (ref 11.8–15.2)
LYMPHOCYTES # BLD AUTO: 0.4 K/MM3 (ref 1.2–5.4)
LYMPHOCYTES NFR BLD AUTO: 7 % (ref 13.4–35)
MCHC RBC AUTO-ENTMCNC: 31 % (ref 32–34)
MCV RBC AUTO: 92 FL (ref 84–94)
MONOCYTES # (AUTO): 0.2 K/MM3 (ref 0–0.8)
MONOCYTES % (AUTO): 3.1 % (ref 0–7.3)
MUCOUS THREADS #/AREA URNS HPF: (no result) /HPF
PH UR STRIP: 5 [PH] (ref 5–7)
PLATELET # BLD: 213 K/MM3 (ref 140–440)
RBC # BLD AUTO: 4.84 M/MM3 (ref 3.65–5.03)
RBC #/AREA URNS HPF: < 1 /HPF (ref 0–6)
UROBILINOGEN UR-MCNC: < 2 MG/DL (ref ?–2)
WBC #/AREA URNS HPF: < 1 /HPF (ref 0–6)

## 2022-06-29 PROCEDURE — 80053 COMPREHEN METABOLIC PANEL: CPT

## 2022-06-29 PROCEDURE — 80048 BASIC METABOLIC PNL TOTAL CA: CPT

## 2022-06-29 PROCEDURE — 94640 AIRWAY INHALATION TREATMENT: CPT

## 2022-06-29 PROCEDURE — 83690 ASSAY OF LIPASE: CPT

## 2022-06-29 PROCEDURE — 84100 ASSAY OF PHOSPHORUS: CPT

## 2022-06-29 PROCEDURE — 82962 GLUCOSE BLOOD TEST: CPT

## 2022-06-29 PROCEDURE — 80076 HEPATIC FUNCTION PANEL: CPT

## 2022-06-29 PROCEDURE — 83735 ASSAY OF MAGNESIUM: CPT

## 2022-06-29 PROCEDURE — 81001 URINALYSIS AUTO W/SCOPE: CPT

## 2022-06-29 PROCEDURE — 93005 ELECTROCARDIOGRAM TRACING: CPT

## 2022-06-29 PROCEDURE — 36415 COLL VENOUS BLD VENIPUNCTURE: CPT

## 2022-06-29 PROCEDURE — 82805 BLOOD GASES W/O2 SATURATION: CPT

## 2022-06-29 PROCEDURE — 85025 COMPLETE CBC W/AUTO DIFF WBC: CPT

## 2022-06-29 PROCEDURE — 83036 HEMOGLOBIN GLYCOSYLATED A1C: CPT

## 2022-06-29 NOTE — EMERGENCY DEPARTMENT REPORT
ED General Adult HPI





- General


Chief complaint: Hyperglycemia


Stated complaint: HIGH BLOOD SUGAR


Time Seen by Provider: 06/29/22 21:25


Source: EMS


Mode of arrival: Stretcher


Limitations: No Limitations





- History of Present Illness


Initial comments: 





22-year-old male with a past medical history of insulin-dependent diabetes 

presents to the hospital with hyperglycemia, nausea, vomiting x1 day.  Patient 

denies fever or urinary symptoms.  He is states he is compliant with his 

medications.  Took 7 units of Humalog at home prior to arriving to the ED.  Blo

od glucose 446 upon arrival.  Patient received 400 mL normal saline via EMS.  

Previous history of DKA as per medical record


Severity scale (0 -10): 0





- Related Data


                                Home Medications











 Medication  Instructions  Recorded  Confirmed  Last Taken


 


Insulin Lispro [HumaLOG VIAL] 0 units SQ AC 02/13/17 04/24/21 02/13/17








                                  Previous Rx's











 Medication  Instructions  Recorded  Last Taken  Type


 


Insulin Detemir [Levemir VIAL] 25 units SQ DAILY #10 unit 12/08/20 Unknown Rx


 


Amoxicillin/K Clav Tab [Augmentin 1 each PO Q12HR #10 tablet 04/25/21 Unknown Rx





875MG TAB]    


 


Insulin Glargine [Lantus VIAL] 30 units SUB-Q QAMDIAB #10 units 04/25/21 Unknown

Rx


 


Lispro Insulin [HumaLOG] 8 unit SUB-Q AC #10 ml 04/25/21 Unknown Rx











                                    Allergies











Allergy/AdvReac Type Severity Reaction Status Date / Time


 


No Known Allergies Allergy   Unverified 02/13/17 08:22














ED Review of Systems


ROS: 


Stated complaint: HIGH BLOOD SUGAR


Other details as noted in HPI





Comment: All other systems reviewed and negative





ED Past Medical Hx





- Past Medical History


Hx Congestive Heart Failure: No


Hx Diabetes: Yes


Hx Asthma: No


Hx COPD: No


Additional medical history: Boil





- Social History


Smoking Status: Unknown if ever smoked





- Medications


Home Medications: 


                                Home Medications











 Medication  Instructions  Recorded  Confirmed  Last Taken  Type


 


Insulin Lispro [HumaLOG VIAL] 0 units SQ AC 02/13/17 04/24/21 02/13/17 History


 


Insulin Detemir [Levemir VIAL] 25 units SQ DAILY #10 unit 12/08/20 04/24/21 Unkn

own Rx


 


Amoxicillin/K Clav Tab [Augmentin 1 each PO Q12HR #10 tablet 04/25/21  Unknown 

Rx





875MG TAB]     


 


Insulin Glargine [Lantus VIAL] 30 units SUB-Q QAMDIAB #10 units 04/25/21  

Unknown Rx


 


Lispro Insulin [HumaLOG] 8 unit SUB-Q AC #10 ml 04/25/21  Unknown Rx














ED Physical Exam





- General


Limitations: No Limitations





- Other


Other exam information: 





General: No acute distress


Head: Atraumatic


Eyes: normal appearance


ENT: Moist mucous membranes


Neck: Normal appearance, no midline tenderness


Chest: Mild tachypnea noted without respiratory distress


CV: Regular rate and rhythm


Abdomen: Soft, normal bowel sounds, nontender, nondistended, no rebound or 

guarding


Back: Normal inspection


Extremity: Normal inspection, full range of motion


Neuro: Alert O x 3, no facial asymmetry, speech clear, no gross motor sensory 

deficit


Psych: Appropriate behavior


Skin: No rash





ED Course


                                   Vital Signs











  06/29/22 06/29/22 06/29/22





  20:56 21:53 22:00


 


Temperature 97.3 F L  


 


Pulse Rate 110 H 98 H 104 H


 


Pulse Rate [   





None]   


 


Respiratory  24 24





Rate   


 


Blood Pressure   146/106


 


Blood Pressure 164/84  





[Left]   


 


O2 Sat by Pulse  100 100





Oximetry   














  06/29/22 06/29/22 06/30/22





  22:17 23:00 00:00


 


Temperature 98.2 F  


 


Pulse Rate 104 H 117 H 141 H


 


Pulse Rate [   





None]   


 


Respiratory 19 26 H 33 H





Rate   


 


Blood Pressure  148/100 157/92


 


Blood Pressure 148/100  





[Left]   


 


O2 Sat by Pulse 100 100 100





Oximetry   














  06/30/22 06/30/22





  01:08 02:00


 


Temperature  98.1 F


 


Pulse Rate 127 H 138 H


 


Pulse Rate [  137 H





None]  


 


Respiratory 14 30 H





Rate  


 


Blood Pressure 157/92 174/110


 


Blood Pressure  





[Left]  


 


O2 Sat by Pulse  100





Oximetry  














- Consultations


Consultation #1: 





06/29/22 23:54


critical care consult with Dr Tan ordered


06/30/22 00:27


Attempted to call [place] for approval for admission here.  However, they appear 

to be have system issues and calls are not going through


06/30/22 00:53


Dr Nuno from [place] called back and approved admission here





ED Medical Decision Making





- Lab Data


Result diagrams: 


                                 06/29/22 21:55





                                 06/30/22 14:51








                                   Lab Results











  06/29/22 06/29/22 06/29/22 Range/Units





  21:55 21:55 Unknown 


 


WBC  6.4    (4.5-11.0)  K/mm3


 


RBC  4.84    (3.65-5.03)  M/mm3


 


Hgb  13.9    (11.8-15.2)  gm/dl


 


Hct  44.7    (35.5-45.6)  %


 


MCV  92    (84-94)  fl


 


MCH  29    (28-32)  pg


 


MCHC  31 L    (32-34)  %


 


RDW  15.8 H    (13.2-15.2)  %


 


Plt Count  213    (140-440)  K/mm3


 


Lymph % (Auto)  7.0 L    (13.4-35.0)  %


 


Mono % (Auto)  3.1    (0.0-7.3)  %


 


Eos % (Auto)  0.1    (0.0-4.3)  %


 


Baso % (Auto)  2.1 H    (0.0-1.8)  %


 


Lymph # (Auto)  0.4 L    (1.2-5.4)  K/mm3


 


Mono # (Auto)  0.2    (0.0-0.8)  K/mm3


 


Eos # (Auto)  0.0    (0.0-0.4)  K/mm3


 


Baso # (Auto)  0.1    (0.0-0.1)  K/mm3


 


Seg Neutrophils %  87.7 H    (40.0-70.0)  %


 


Seg Neutrophils #  5.6    (1.8-7.7)  K/mm3


 


Sodium   133 L   (137-145)  mmol/L


 


Potassium   5.7 H   (3.6-5.0)  mmol/L


 


Chloride   92.7 L   ()  mmol/L


 


Carbon Dioxide   4 L*   (22-30)  mmol/L


 


Anion Gap   42   mmol/L


 


BUN   17   (9-20)  mg/dL


 


Creatinine   1.8 H   (0.8-1.3)  mg/dL


 


Estimated GFR   58   ml/min


 


BUN/Creatinine Ratio   9   %


 


Glucose   547 H*   ()  mg/dL


 


Calcium   9.1   (8.4-10.2)  mg/dL


 


Total Bilirubin   0.60   (0.1-1.2)  mg/dL


 


AST   120 H   (5-40)  units/L


 


ALT   409 H   (7-56)  units/L


 


Alkaline Phosphatase   161 H   ()  units/L


 


Total Protein   7.6   (6.3-8.2)  g/dL


 


Albumin   4.4   (3.9-5)  g/dL


 


Albumin/Globulin Ratio   1.4   %


 


Lipase   32   (13-60)  units/L


 


Urine Color    Colorless  (Yellow)  


 


Urine Turbidity    Clear  (Clear)  


 


Urine pH    5.0  (5.0-7.0)  


 


Ur Specific Gravity    1.016  (1.003-1.030)  


 


Urine Protein    30 mg/dl  (Negative)  mg/dL


 


Urine Glucose (UA)    >=500  (Negative)  mg/dL


 


Urine Ketones    80  (Negative)  mg/dL


 


Urine Blood    Sm  (Negative)  


 


Urine Nitrite    Neg  (Negative)  


 


Urine Bilirubin    Neg  (Negative)  


 


Urine Urobilinogen    < 2.0  (<2.0)  mg/dL


 


Ur Leukocyte Esterase    Neg  (Negative)  


 


Urine WBC (Auto)    < 1.0  (0.0-6.0)  /HPF


 


Urine RBC (Auto)    < 1.0  (0.0-6.0)  /HPF


 


Urine Bacteria (Auto)    1+  (Negative)  /HPF


 


Urine Mucus    Few  /HPF














- EKG Data


-: EKG Interpreted by Me


EKG shows normal: sinus rhythm, ST-T waves (No STEMI)


Rate: normal (99)





- Medical Decision Making





22-year-old male with apparent diabetes presents to the hospital with 

hyperglycemia, nausea, vomiting.  Patient noted to have acute renal sufficiency 

compared to previous labs suggestive of DKA.  Patient treated with normal 

saline, Zofran, insulin bolus and drip.  Patient required ICU admission for 

further treatment.  Hospitalist critical care consult ordered.  Hospitalist to 

admit





- Differential Diagnosis


DKA,, hyperglycemia, UTI


Critical Care Time: Yes


Critical care time in (mins) excluding proc time.: 35


Critical care attestation.: 


If time is entered above; I have spent that time in minutes in the direct care 

of this critically ill patient, excluding procedure time.





Critical Care Time: 





35 Minutes of critical care time excluding procedures were used in the care of 

the patient.   I came immediately to the bedside upon patient's arrival.I 

discussed treatment plan with the nursing team members. I reviewed electronic 

record. Patient required multiple interventions and reassessments.  Spoke with 

hospitalist and consultants for collaborative care





ED Disposition


Clinical Impression: 


 DKA (diabetic ketoacidoses), Hyperkalemia, Nausea & vomiting, Acute renal 

insufficiency





Disposition: 09 ADMITTED AS INPATIENT


Is pt being admited?: Yes


Condition: Stable


Time of Disposition: 23:54 (Dr fung/hospitalist)

## 2022-06-30 LAB
BUN SERPL-MCNC: 10 MG/DL (ref 9–20)
BUN SERPL-MCNC: 14 MG/DL (ref 9–20)
BUN SERPL-MCNC: 16 MG/DL (ref 9–20)
BUN SERPL-MCNC: 7 MG/DL (ref 9–20)
BUN SERPL-MCNC: 9 MG/DL (ref 9–20)
BUN/CREAT SERPL: 6 %
BUN/CREAT SERPL: 7 %
BUN/CREAT SERPL: 7 %
BUN/CREAT SERPL: 8 %
BUN/CREAT SERPL: 9 %
CALCIUM SERPL-MCNC: 7.5 MG/DL (ref 8.4–10.2)
CALCIUM SERPL-MCNC: 7.6 MG/DL (ref 8.4–10.2)
CALCIUM SERPL-MCNC: 7.8 MG/DL (ref 8.4–10.2)
CALCIUM SERPL-MCNC: 8 MG/DL (ref 8.4–10.2)
CALCIUM SERPL-MCNC: 8.4 MG/DL (ref 8.4–10.2)
HEMOLYSIS INDEX: 0
HEMOLYSIS INDEX: 13
HEMOLYSIS INDEX: 34
HEMOLYSIS INDEX: 5
HEMOLYSIS INDEX: 8

## 2022-06-30 RX ADMIN — HEPARIN SODIUM SCH UNIT: 5000 INJECTION, SOLUTION INTRAVENOUS; SUBCUTANEOUS at 20:59

## 2022-06-30 RX ADMIN — IPRATROPIUM BROMIDE AND ALBUTEROL SULFATE SCH AMPUL: .5; 3 SOLUTION RESPIRATORY (INHALATION) at 07:35

## 2022-06-30 RX ADMIN — Medication SCH ML: at 09:03

## 2022-06-30 RX ADMIN — DEXTROSE, SODIUM CHLORIDE, AND POTASSIUM CHLORIDE SCH MLS/HR: 5; .45; .15 INJECTION INTRAVENOUS at 11:45

## 2022-06-30 RX ADMIN — Medication SCH ML: at 20:59

## 2022-06-30 RX ADMIN — DEXTROSE, SODIUM CHLORIDE, AND POTASSIUM CHLORIDE SCH MLS/HR: 5; .45; .15 INJECTION INTRAVENOUS at 04:06

## 2022-06-30 RX ADMIN — IPRATROPIUM BROMIDE AND ALBUTEROL SULFATE SCH: .5; 3 SOLUTION RESPIRATORY (INHALATION) at 05:05

## 2022-06-30 RX ADMIN — DEXTROSE, SODIUM CHLORIDE, AND POTASSIUM CHLORIDE SCH MLS/HR: 5; .45; .15 INJECTION INTRAVENOUS at 20:46

## 2022-06-30 RX ADMIN — FAMOTIDINE SCH MG: 10 INJECTION, SOLUTION INTRAVENOUS at 09:06

## 2022-06-30 RX ADMIN — HEPARIN SODIUM SCH UNIT: 5000 INJECTION, SOLUTION INTRAVENOUS; SUBCUTANEOUS at 09:03

## 2022-06-30 RX ADMIN — FAMOTIDINE SCH MG: 10 INJECTION, SOLUTION INTRAVENOUS at 20:59

## 2022-06-30 RX ADMIN — IPRATROPIUM BROMIDE AND ALBUTEROL SULFATE SCH AMPUL: .5; 3 SOLUTION RESPIRATORY (INHALATION) at 20:17

## 2022-06-30 RX ADMIN — IPRATROPIUM BROMIDE AND ALBUTEROL SULFATE SCH AMPUL: .5; 3 SOLUTION RESPIRATORY (INHALATION) at 14:01

## 2022-06-30 NOTE — HISTORY AND PHYSICAL REPORT
History of Present Illness


Date of examination: 06/30/22


Date of admission: 


06/30/21


Chief complaint: 





Hyperglycemia


History of present illness: 





22-year-old male with  history of insulin-dependent diabetes was brought to the 

emergency room because of hyperglycemia, nausea, vomiting x1 day.  Patient 

denies fever or urinary symptoms.  He is states he is compliant with his 

medications.  Took 7 units of Humalog at home prior to arriving to the ED.  

Blood glucose 446 upon arrival.  Patient received 400 mL normal saline via EMS. 

Previous history of DKA as per medical record





In the emergency room patient is found to have blood glucose of 547, bicarb of 

4, anion gap 42, potassium of 5.7, BUN 17 creatinine 1.8, , .  So 

going to admit the patient to the ICU we will put the patient on DKA pathway we 

also consult critical care evaluation





Past History


Past Medical History: hypertension, liver disease, other (Boil)


Past Surgical History: No surgical history


Social history: no significant social history


Family history: diabetes





Medications and Allergies


                                    Allergies











Allergy/AdvReac Type Severity Reaction Status Date / Time


 


No Known Allergies Allergy   Unverified 02/13/17 08:22











                                Home Medications











 Medication  Instructions  Recorded  Confirmed  Last Taken  Type


 


Insulin Lispro [HumaLOG VIAL] 0 units SQ AC 02/13/17 04/24/21 02/13/17 History


 


Insulin Detemir [Levemir VIAL] 25 units SQ DAILY #10 unit 12/08/20 04/24/21 

Unknown Rx


 


Amoxicillin/K Clav Tab [Augmentin 1 each PO Q12HR #10 tablet 04/25/21  Unknown 

Rx





875MG TAB]     


 


Insulin Glargine [Lantus VIAL] 30 units SUB-Q QAMDIAB #10 units 04/25/21  

Unknown Rx


 


Lispro Insulin [HumaLOG] 8 unit SUB-Q AC #10 ml 04/25/21  Unknown Rx











Active Meds: 


Active Medications





Acetaminophen (Acetaminophen 325 Mg Tab)  650 mg PO Q4H PRN


   PRN Reason: Pain MILD(1-3)/Fever >100.5/HA


Albuterol (Albuterol 2.5 Mg/3 Ml Nebu)  2.5 mg IH Q3HRT PRN


   PRN Reason: Shortness Of Breath


Albuterol/Ipratropium (Ipratropium/Albuterol Sulfate 3 Ml Ampul.Neb)  1 ampul IH

 Q6HRT NATY


Dextrose (Dextrose 50% In Water (25gm) 50 Ml Syringe)  0 ml IV Q30MIN PRN; 

Protocol


   PRN Reason: Hypoglycemia


Dextrose (Dextrose 50% In Water (25gm) 50 Ml Syringe)  0 ml IV Q30MIN PRN; 

Protocol


   PRN Reason: Hypoglycemia


Famotidine (Famotidine 20 Mg/2 Ml Inj)  20 mg IV BID NATY


Heparin Sodium (Porcine) (Heparin 5,000 Unit/1 Ml Vial)  5,000 unit SUB-Q Q12HR 

NATY


Insulin Human Regular 100 (units/ Sodium Chloride)  100 mls @ 1 mls/hr IV TITR 

NATY; Protocol


Insulin Human Regular 100 (units/ Sodium Chloride)  100 mls @ 1 mls/hr IV TITR 

NATY; Protocol


Sodium Chloride (Nacl 0.45% 1000 Ml)  1,000 mls @ 150 mls/hr IV AS DIRECT NATY


Potassium Chloride/Dextrose/Sod Cl (D5w/0.45% Nacl/Kcl 20 Meq)  20 meq in 1,000 

mls @ 125 mls/hr IV AS DIRECT NATY


Morphine Sulfate (Morphine 2 Mg/1 Ml Inj)  2 mg IV Q4H PRN


   PRN Reason: Pain, Moderate (4-6)


Morphine Sulfate (Morphine 4 Mg/1 Ml Inj)  4 mg IV Q4H PRN


   PRN Reason: Pain , Severe (7-10)


Ondansetron HCl (Ondansetron 4 Mg/2 Ml Inj)  4 mg IV Q8H PRN


   PRN Reason: Nausea And Vomiting


Sodium Chloride (Sodium Chloride 0.9% 10 Ml Flush Syringe)  10 ml IV BID NATY


Sodium Chloride (Sodium Chloride 0.9% 10 Ml Flush Syringe)  10 ml IV PRN PRN


   PRN Reason: LINE FLUSH











Review of Systems


All systems: negative


Constitutional: other (Hyperglycemia)


Gastrointestinal: nausea, vomiting





Exam





- Constitutional


Vitals: 


                                        











Temp Pulse Resp BP Pulse Ox


 


 98.2 F   104 H  19   148/100   100 


 


 06/29/22 22:17  06/29/22 22:17  06/29/22 22:17  06/29/22 22:17  06/29/22 22:17











General appearance: Present: no acute distress, well-nourished





- EENT


Eyes: Present: PERRL


ENT: hearing intact, clear oral mucosa





- Neck


Neck: Present: supple, normal ROM





- Respiratory


Respiratory effort: normal


Respiratory: bilateral: CTA





- Cardiovascular


Heart Sounds: Present: S1 & S2.  Absent: rub, click





- Extremities


Extremities: pulses symmetrical, No edema


Peripheral Pulses: within normal limits





- Abdominal


General gastrointestinal: Present: soft, non-tender, non-distended, normal bowel

 sounds


Male genitourinary: Present: normal





- Integumentary


Integumentary: Present: clear, warm, dry





- Musculoskeletal


Musculoskeletal: gait normal, strength equal bilaterally





- Psychiatric


Psychiatric: appropriate mood/affect, intact judgment & insight





- Neurologic


Neurologic: CNII-XII intact, moves all extremities





Results





- Labs


CBC & Chem 7: 


                                 06/29/22 21:55





                                 06/29/22 21:55


Labs: 


                             Laboratory Last Values











WBC  6.4 K/mm3 (4.5-11.0)   06/29/22  21:55    


 


RBC  4.84 M/mm3 (3.65-5.03)   06/29/22  21:55    


 


Hgb  13.9 gm/dl (11.8-15.2)   06/29/22  21:55    


 


Hct  44.7 % (35.5-45.6)   06/29/22  21:55    


 


MCV  92 fl (84-94)   06/29/22  21:55    


 


MCH  29 pg (28-32)   06/29/22  21:55    


 


MCHC  31 % (32-34)  L  06/29/22  21:55    


 


RDW  15.8 % (13.2-15.2)  H  06/29/22  21:55    


 


Plt Count  213 K/mm3 (140-440)   06/29/22  21:55    


 


Lymph % (Auto)  7.0 % (13.4-35.0)  L  06/29/22  21:55    


 


Mono % (Auto)  3.1 % (0.0-7.3)   06/29/22  21:55    


 


Eos % (Auto)  0.1 % (0.0-4.3)   06/29/22  21:55    


 


Baso % (Auto)  2.1 % (0.0-1.8)  H  06/29/22  21:55    


 


Lymph # (Auto)  0.4 K/mm3 (1.2-5.4)  L  06/29/22  21:55    


 


Mono # (Auto)  0.2 K/mm3 (0.0-0.8)   06/29/22  21:55    


 


Eos # (Auto)  0.0 K/mm3 (0.0-0.4)   06/29/22  21:55    


 


Baso # (Auto)  0.1 K/mm3 (0.0-0.1)   06/29/22  21:55    


 


Seg Neutrophils %  87.7 % (40.0-70.0)  H  06/29/22  21:55    


 


Seg Neutrophils #  5.6 K/mm3 (1.8-7.7)   06/29/22  21:55    


 


VBG pH  7.038  (7.320-7.420)  L*  06/29/22  23:56    


 


Sodium  133 mmol/L (137-145)  L  06/29/22  21:55    


 


Potassium  5.7 mmol/L (3.6-5.0)  H  06/29/22  21:55    


 


Chloride  92.7 mmol/L ()  L  06/29/22  21:55    


 


Carbon Dioxide  4 mmol/L (22-30)  L*  06/29/22  21:55    


 


Anion Gap  42 mmol/L  06/29/22  21:55    


 


BUN  17 mg/dL (9-20)   06/29/22  21:55    


 


Creatinine  1.8 mg/dL (0.8-1.3)  H  06/29/22  21:55    


 


Estimated GFR  58 ml/min  06/29/22  21:55    


 


BUN/Creatinine Ratio  9 %  06/29/22  21:55    


 


Glucose  547 mg/dL ()  H*  06/29/22  21:55    


 


Calcium  9.1 mg/dL (8.4-10.2)   06/29/22  21:55    


 


Phosphorus  4.50 mg/dL (2.5-4.5)   06/29/22  23:56    


 


Magnesium  2.30 mg/dL (1.7-2.3)   06/29/22  23:56    


 


Total Bilirubin  0.60 mg/dL (0.1-1.2)   06/29/22  21:55    


 


AST  120 units/L (5-40)  H  06/29/22  21:55    


 


ALT  409 units/L (7-56)  H  06/29/22  21:55    


 


Alkaline Phosphatase  161 units/L ()  H  06/29/22  21:55    


 


Total Protein  7.6 g/dL (6.3-8.2)   06/29/22  21:55    


 


Albumin  4.4 g/dL (3.9-5)   06/29/22  21:55    


 


Albumin/Globulin Ratio  1.4 %  06/29/22  21:55    


 


Lipase  32 units/L (13-60)   06/29/22  21:55    


 


Urine Color  Colorless  (Yellow)   06/29/22  Unknown


 


Urine Turbidity  Clear  (Clear)   06/29/22  Unknown


 


Urine pH  5.0  (5.0-7.0)   06/29/22  Unknown


 


Ur Specific Gravity  1.016  (1.003-1.030)   06/29/22  Unknown


 


Urine Protein  30 mg/dl mg/dL (Negative)   06/29/22  Unknown


 


Urine Glucose (UA)  >=500 mg/dL (Negative)   06/29/22  Unknown


 


Urine Ketones  80 mg/dL (Negative)   06/29/22  Unknown


 


Urine Blood  Sm  (Negative)   06/29/22  Unknown


 


Urine Nitrite  Neg  (Negative)   06/29/22  Unknown


 


Urine Bilirubin  Neg  (Negative)   06/29/22  Unknown


 


Urine Urobilinogen  < 2.0 mg/dL (<2.0)   06/29/22  Unknown


 


Ur Leukocyte Esterase  Neg  (Negative)   06/29/22  Unknown


 


Urine WBC (Auto)  < 1.0 /HPF (0.0-6.0)   06/29/22  Unknown


 


Urine RBC (Auto)  < 1.0 /HPF (0.0-6.0)   06/29/22  Unknown


 


Urine Bacteria (Auto)  1+ /HPF (Negative)   06/29/22  Unknown


 


Urine Mucus  Few /HPF  06/29/22  Unknown














Assessment and Plan


VTE prophylaxis?: Chemical


Plan of care discussed with patient/family: Yes





- Patient Problems


(1) DKA (diabetic ketoacidoses)


Status: Acute   


Qualifiers: 


 


Plan to address problem: 


Admit the patient to the ICU.  NPO.  Half-normal saline at the rate of 150 cc/h.

  DKA pathway as per protocol.  Insulin drip.  We will do the serial BMP.  Will 

consult critical care evaluation








(2) Dehydration


Status: Acute   


Plan to address problem: 


Half-normal saline at the rate of 150 cc/h.  We will rehydrate the patient 

properly.  We will recheck BMP in the morning








(3) Gastritis


Status: Acute   


Plan to address problem: 


Pepcid 20 mg IV every 12 hours.  We will continue the home medication








(4) Hyperkalemia


Status: Acute   


Plan to address problem: 


Half-normal saline at the rate of 150 cc/h.  Insulin drip as per protocol.  

Patient is on DKA pathway.  We do the serial BMP








(5) Metabolic acidosis


Status: Acute   


Plan to address problem: 


Half-normal saline at the rate of 150 cc/h.  Insulin drip as per protocol.  

Patient is on DKA pathway.  We do the serial BMP








(6) Nausea & vomiting


Status: Acute   


Plan to address problem: 


Pepcid 20 mg IV every 12 hours.  Zofran 4 mg IV every 6 hours as needed.








(7) Renal insufficiency


Status: Acute   


Plan to address problem: 


Half-normal saline at the rate of 150 cc/h.  Avoid nephrotoxic drug.  Renally 

dose medication. we do the serial BMP








(8) DVT prophylaxis


Status: Acute   


Plan to address problem: 


Heparin 5000 units subcu every 12 hours for DVT prophylaxis.  Pepcid 20 mg IV 

every 12 hours for GI prophylaxis.  Patient is a full code

## 2022-06-30 NOTE — EVENT NOTE
Date: 06/30/22


This is a 22 year old male with type one DM who presented to the ED on 6/29 with

inability to pee and weakness. Patient reports he ran out of his insulin lantus 

but administered 7 units of humalog prior to presentation as his blood glucose 

was high at [facility].  Work-up in the emergency 

department revealed glucose of 554, bicarb 4, anion gap 42, hyperkalemia, acute 

kidney injury, transaminitis.  Patient was admitted to the hospitalist service 

with consults to [facility] on the DKA pathway.








Hospital course to date:





6/30: Patient received total of 3 LR boluses today.  Anion gap is improving 

however not closed yet.  P.m. BMP pending.  Insulin drip was turned off per 

protocol as blood glucose was less than 100.  Patient states that he received 

Pf[employer] vaccine x2 for COVID-19.





This is a 22-year-old male with IDDM since age 8 admitted with DKA and NICOLÁS





Neuro: NAD


-Reorientation as needed


-Maintain sleep-wake cycle


-aspiration/seizure precautions


-As needed analgesia





Cardiac: ST


-Blood pressure monitoring per protocol





Respiratory: NAD


-Supplemental oxygen as needed


-Currently on room air


-Pulmonary hygiene


-SPO2 monitor per protocol





GI: Transaminitis


-PPI


-N.p.o. for now


-BR: When appropriate


-Trend LFTs





: Acute kidney injury likely secondary to vasomotor nephropathy, hyperkalemia,

hyperchloremia hyponatremia, severe metabolic acidosis


-Monitor intake and output


-Renally dose medications


-Avoid nephrotoxic medications


-S/p IV push bicarb


-Trend BMP





ID: NAD


-Monitor WBC and temperature curve





Endo: DKA, h/o IDDM


-Patient he ran out of Lantus


-Reports home Lantus and Humalog on SSI


-Presented with blood glucose 547, metabolic acidosis 4, potassium 5.7, sodium 

133


-VBG's 7.038


-Started on DKA pathway


-Currently on insulin drip


-Avoid hypoglycemia


-Transition to SSI and long-acting insulin when able


-Hemoglobin A1c pending





Heme: NAD


-Trend CBC


-Transfuse hemoglobin less than 7


-SCDs to BLE while in bed














The high probability of a clinically significant, sudden or life threatening 

deterioration of the [endo] system(s) required my full and direct attention, 

intervention and personal management. The aggregate critical care time was [60] 

minutes. This time is in addition to time spent performing reported procedures 

but includes the following: 





[x] Data Review and interpretation 





[x] Patient assessment and monitoring of vital signs 





[x] Documentation 





[x] Medication orders and management

## 2022-06-30 NOTE — CONSULTATION
History of Present Illness





- Reason for Consult


Consult date: 06/30/22


DKA


Requesting physician: MACY GLEZ





- History of Present Illness





23 y/o male with known Type 1 diabetes admitted with DKA.  Severe metabolic 

acidosis from this.  Started on IVF's and insulin drip.  Gap is now improving 

but still not closed.  Per patient was out of Humolog.





Past History


Past Medical History: hypertension, liver disease, other (Boil)


Past Surgical History: No surgical history


Social history: no significant social history


Family history: diabetes





Medications and Allergies


                                    Allergies











Allergy/AdvReac Type Severity Reaction Status Date / Time


 


No Known Allergies Allergy   Unverified 02/13/17 08:22











                                Home Medications











 Medication  Instructions  Recorded  Confirmed  Last Taken  Type


 


Insulin Lispro [HumaLOG VIAL] 0 units SQ AC 02/13/17 04/24/21 02/13/17 History


 


Insulin Detemir [Levemir VIAL] 25 units SQ DAILY #10 unit 12/08/20 04/24/21 

Unknown Rx


 


Amoxicillin/K Clav Tab [Augmentin 1 each PO Q12HR #10 tablet 04/25/21  Unknown 

Rx





875MG TAB]     


 


Insulin Glargine [Lantus VIAL] 30 units SUB-Q QAMDIAB #10 units 04/25/21  

Unknown Rx


 


Lispro Insulin [HumaLOG] 8 unit SUB-Q AC #10 ml 04/25/21  Unknown Rx











Active Meds: 


Active Medications





Acetaminophen (Acetaminophen 325 Mg Tab)  650 mg PO Q4H PRN


   PRN Reason: Pain MILD(1-3)/Fever >100.5/HA


Albuterol (Albuterol 2.5 Mg/3 Ml Nebu)  2.5 mg IH Q3HRT PRN


   PRN Reason: Shortness Of Breath


Albuterol/Ipratropium (Ipratropium/Albuterol Sulfate 3 Ml Ampul.Neb)  1 ampul IH

 Q6HRT [facility]


   Last Admin: 06/30/22 07:35 Dose:  1 ampul


   


Dextrose (Dextrose 50% In Water (25gm) 50 Ml Syringe)  0 ml IV Q30MIN PRN; Prot

ocol


   PRN Reason: Hypoglycemia


Famotidine (Famotidine 20 Mg/2 Ml Inj)  20 mg IV BID [facility]


   Last Admin: 06/30/22 09:06 Dose:  20 mg


   


Heparin Sodium (Porcine) (Heparin 5,000 Unit/1 Ml Vial)  5,000 unit SUB-Q Q12HR 

NATY


   Last Admin: 06/30/22 09:03 Dose:  5,000 unit


   


Insulin Human Regular 100 (units/ Sodium Chloride)  100 mls @ 1 mls/hr IV TITR 

NATY; Protocol


   Last Titration: 06/30/22 11:38 Dose:  1.5 units/hr, 1.5 mls/hr


   


Sodium Chloride (Nacl 0.45% 1000 Ml)  1,000 mls @ 150 mls/hr IV AS DIRECT NATY


   Last Infusion: 06/30/22 06:36 Dose:  0 mls/hr


   


Potassium Chloride/Dextrose/Sod Cl (D5w/0.45% Nacl/Kcl 20 Meq)  20 meq in 1,000 

mls @ 125 mls/hr IV AS DIRECT NATY


   Last Admin: 06/30/22 11:45 Dose:  125 mls/hr


   


Lactated Ringer's (Lactated Ringers)  1,000 mls @ 999 mls/hr IV BOLUS ONE


   Stop: 06/30/22 13:00


   Last Admin: 06/30/22 11:45 Dose:  999 mls/hr


   


Lactated Ringer's (Lactated Ringers)  1,000 mls @ 999 mls/hr IV BOLUS ONE


   Stop: 06/30/22 14:00


Morphine Sulfate (Morphine 2 Mg/1 Ml Inj)  2 mg IV Q4H PRN


   PRN Reason: Pain, Moderate (4-6)


Morphine Sulfate (Morphine 4 Mg/1 Ml Inj)  4 mg IV Q4H PRN


   PRN Reason: Pain , Severe (7-10)


Ondansetron HCl (Ondansetron 4 Mg/2 Ml Inj)  4 mg IV Q8H PRN


   PRN Reason: Nausea And Vomiting


Sodium Chloride (Sodium Chloride 0.9% 10 Ml Flush Syringe)  10 ml IV BID [facility]


   Last Admin: 06/30/22 09:03 Dose:  10 ml


   


Sodium Chloride (Sodium Chloride 0.9% 10 Ml Flush Syringe)  10 ml IV PRN PRN


   PRN Reason: LINE FLUSH











Review of Systems


All systems: negative





Exam





- Constitutional


Vitals: 


                                        











Temp Pulse Resp BP Pulse Ox


 


 98.3 F   99 H  14   123/82   100 


 


 06/30/22 11:41  06/30/22 10:00  06/30/22 10:00  06/30/22 10:00  06/30/22 10:00











General appearance: Present: no acute distress, well-nourished





- EENT


Eyes: Present: PERRL, EOM intact


ENT: hearing intact, clear oral mucosa, dentition normal





- Neck


Neck: Present: supple, normal ROM





- Respiratory


Respiratory effort: normal


Respiratory: bilateral: CTA





- Cardiovascular


Rhythm: regular


Heart Sounds: Present: S1 & S2





- Extremities


Extremities: no ischemia, pulses intact, pulses symmetrical





- Abdominal


General gastrointestinal: Present: soft, non-tender, normal bowel sounds


Male genitourinary: Present: deferred





- Rectal


Rectal Exam: deferred





- Integumentary


Integumentary: Present: clear, warm, dry





- Musculoskeletal


Musculoskeletal: strength equal bilaterally





- Psychiatric


Psychiatric: appropriate mood/affect





Results





- Labs


CBC & Chem 7: 


                                 06/29/22 21:55





                                 06/30/22 09:26


Labs: 


                              Abnormal lab results











  06/29/22 06/29/22 06/29/22 Range/Units





  21:55 21:55 23:56 


 


MCHC  31 L    (32-34)  %


 


RDW  15.8 H    (13.2-15.2)  %


 


Lymph % (Auto)  7.0 L    (13.4-35.0)  %


 


Baso % (Auto)  2.1 H    (0.0-1.8)  %


 


Lymph # (Auto)  0.4 L    (1.2-5.4)  K/mm3


 


Seg Neutrophils %  87.7 H    (40.0-70.0)  %


 


VBG pH    7.038 L*  (7.320-7.420)  


 


Sodium   133 L   (137-145)  mmol/L


 


Potassium   5.7 H   (3.6-5.0)  mmol/L


 


Chloride   92.7 L   ()  mmol/L


 


Carbon Dioxide   4 L*   (22-30)  mmol/L


 


Creatinine   1.8 H   (0.8-1.3)  mg/dL


 


Glucose   547 H*   ()  mg/dL


 


POC Glucose     ()  mg/dL


 


Calcium     (8.4-10.2)  mg/dL


 


Phosphorus     (2.5-4.5)  mg/dL


 


AST   120 H   (5-40)  units/L


 


ALT   409 H   (7-56)  units/L


 


Alkaline Phosphatase   161 H   ()  units/L














  06/30/22 06/30/22 06/30/22 Range/Units





  01:31 01:31 01:35 


 


MCHC     (32-34)  %


 


RDW     (13.2-15.2)  %


 


Lymph % (Auto)     (13.4-35.0)  %


 


Baso % (Auto)     (0.0-1.8)  %


 


Lymph # (Auto)     (1.2-5.4)  K/mm3


 


Seg Neutrophils %     (40.0-70.0)  %


 


VBG pH     (7.320-7.420)  


 


Sodium  135 L    (137-145)  mmol/L


 


Potassium  5.8 H    (3.6-5.0)  mmol/L


 


Chloride     ()  mmol/L


 


Carbon Dioxide  < 2.0 L*    (22-30)  mmol/L


 


Creatinine  1.7 H    (0.8-1.3)  mg/dL


 


Glucose  544 H*    ()  mg/dL


 


POC Glucose    506 H  ()  mg/dL


 


Calcium  7.8 L    (8.4-10.2)  mg/dL


 


Phosphorus   5.50 H D   (2.5-4.5)  mg/dL


 


AST     (5-40)  units/L


 


ALT     (7-56)  units/L


 


Alkaline Phosphatase     ()  units/L














  06/30/22 06/30/22 06/30/22 Range/Units





  03:03 04:04 04:10 


 


MCHC     (32-34)  %


 


RDW     (13.2-15.2)  %


 


Lymph % (Auto)     (13.4-35.0)  %


 


Baso % (Auto)     (0.0-1.8)  %


 


Lymph # (Auto)     (1.2-5.4)  K/mm3


 


Seg Neutrophils %     (40.0-70.0)  %


 


VBG pH     (7.320-7.420)  


 


Sodium     (137-145)  mmol/L


 


Potassium     (3.6-5.0)  mmol/L


 


Chloride    107.2 H  ()  mmol/L


 


Carbon Dioxide    6 L*  (22-30)  mmol/L


 


Creatinine    1.7 H  (0.8-1.3)  mg/dL


 


Glucose    199 H  ()  mg/dL


 


POC Glucose  275 H  166 H   ()  mg/dL


 


Calcium    7.5 L  (8.4-10.2)  mg/dL


 


Phosphorus     (2.5-4.5)  mg/dL


 


AST     (5-40)  units/L


 


ALT     (7-56)  units/L


 


Alkaline Phosphatase     ()  units/L














  06/30/22 06/30/22 06/30/22 Range/Units





  04:58 06:49 08:58 


 


MCHC     (32-34)  %


 


RDW     (13.2-15.2)  %


 


Lymph % (Auto)     (13.4-35.0)  %


 


Baso % (Auto)     (0.0-1.8)  %


 


Lymph # (Auto)     (1.2-5.4)  K/mm3


 


Seg Neutrophils %     (40.0-70.0)  %


 


VBG pH     (7.320-7.420)  


 


Sodium     (137-145)  mmol/L


 


Potassium     (3.6-5.0)  mmol/L


 


Chloride     ()  mmol/L


 


Carbon Dioxide     (22-30)  mmol/L


 


Creatinine     (0.8-1.3)  mg/dL


 


Glucose     ()  mg/dL


 


POC Glucose  115 H  141 H  122 H  ()  mg/dL


 


Calcium     (8.4-10.2)  mg/dL


 


Phosphorus     (2.5-4.5)  mg/dL


 


AST     (5-40)  units/L


 


ALT     (7-56)  units/L


 


Alkaline Phosphatase     ()  units/L














  06/30/22 06/30/22 Range/Units





  09:26 11:32 


 


MCHC    (32-34)  %


 


RDW    (13.2-15.2)  %


 


Lymph % (Auto)    (13.4-35.0)  %


 


Baso % (Auto)    (0.0-1.8)  %


 


Lymph # (Auto)    (1.2-5.4)  K/mm3


 


Seg Neutrophils %    (40.0-70.0)  %


 


VBG pH    (7.320-7.420)  


 


Sodium    (137-145)  mmol/L


 


Potassium    (3.6-5.0)  mmol/L


 


Chloride  110.0 H   ()  mmol/L


 


Carbon Dioxide  16 L D   (22-30)  mmol/L


 


Creatinine  1.4 H   (0.8-1.3)  mg/dL


 


Glucose  129 H   ()  mg/dL


 


POC Glucose   133 H  ()  mg/dL


 


Calcium  7.6 L   (8.4-10.2)  mg/dL


 


Phosphorus    (2.5-4.5)  mg/dL


 


AST    (5-40)  units/L


 


ALT    (7-56)  units/L


 


Alkaline Phosphatase    ()  units/L














Assessment and Plan





23 y/o male with Diabetic Ketoacidosis with acute kidney injury.





1.  Continue IVF


2.  Continue Insulin drip until Gap closes


3.  Continue NPO status


4.  IF gap closes with next lab draw, ok with feeding and transfer to floor vs 

discharge.  








CCT 31 minutes.

## 2022-07-01 VITALS — SYSTOLIC BLOOD PRESSURE: 133 MMHG | DIASTOLIC BLOOD PRESSURE: 90 MMHG

## 2022-07-01 LAB
ALBUMIN SERPL-MCNC: 2.8 G/DL (ref 3.9–5)
ALT SERPL-CCNC: 209 UNITS/L (ref 7–56)
BASOPHILS # (AUTO): 0 K/MM3 (ref 0–0.1)
BASOPHILS NFR BLD AUTO: 0.8 % (ref 0–1.8)
BILIRUB DIRECT SERPL-MCNC: 0.2 MG/DL (ref 0–0.2)
BUN SERPL-MCNC: 6 MG/DL (ref 9–20)
BUN/CREAT SERPL: 5 %
CALCIUM SERPL-MCNC: 8.3 MG/DL (ref 8.4–10.2)
EOSINOPHIL # BLD AUTO: 0 K/MM3 (ref 0–0.4)
EOSINOPHIL NFR BLD AUTO: 0.7 % (ref 0–4.3)
HCT VFR BLD CALC: 31.8 % (ref 35.5–45.6)
HEMOLYSIS INDEX: 5
HGB BLD-MCNC: 10 GM/DL (ref 11.8–15.2)
LYMPHOCYTES # BLD AUTO: 1.7 K/MM3 (ref 1.2–5.4)
LYMPHOCYTES NFR BLD AUTO: 38.8 % (ref 13.4–35)
MCHC RBC AUTO-ENTMCNC: 32 % (ref 32–34)
MCV RBC AUTO: 87 FL (ref 84–94)
MONOCYTES # (AUTO): 0.2 K/MM3 (ref 0–0.8)
MONOCYTES % (AUTO): 4.3 % (ref 0–7.3)
PLATELET # BLD: 185 K/MM3 (ref 140–440)
RBC # BLD AUTO: 3.67 M/MM3 (ref 3.65–5.03)

## 2022-07-01 RX ADMIN — IPRATROPIUM BROMIDE AND ALBUTEROL SULFATE SCH AMPUL: .5; 3 SOLUTION RESPIRATORY (INHALATION) at 08:14

## 2022-07-01 RX ADMIN — HEPARIN SODIUM SCH UNIT: 5000 INJECTION, SOLUTION INTRAVENOUS; SUBCUTANEOUS at 09:38

## 2022-07-01 RX ADMIN — IPRATROPIUM BROMIDE AND ALBUTEROL SULFATE SCH AMPUL: .5; 3 SOLUTION RESPIRATORY (INHALATION) at 14:42

## 2022-07-01 NOTE — ELECTROCARDIOGRAPH REPORT
[facility]

                                       

Test Date:    2022               Test Time:    21:45:03

Pat Name:     EFRAIN DEAN        Department:   

Patient ID:   [id number]          Room:         A256 1

Gender:       M                        Technician:   NURSE

:          2000               Requested By: KATIE CASTELAN

Order Number: [id number]              Reading MD:   Jameson Carrillo

                                 Measurements

Intervals                              Axis          

Rate:         99                       P:            54

ID:           [id number]                      QRS:          64

QRSD:         75                       T:            19

QT:           326                                    

QTc:          418                                    

                           Interpretive Statements

Sinus rhythm

ST elev, probable normal early repol pattern

No previous ECG available for comparison

Electronically Signed On 2022 11:09:30 EDT by Jameson Carrillo

## 2022-07-01 NOTE — PROGRESS NOTE
Assessment and Plan





21 y/o male with Diabetic Ketoacidosis with acute kidney injury.





7/1/22:  Discussed with pharmacy, correcting lantus dosing if patient is still 

in here in hospital for tonight.  No objection to discharge to home otherwise 

stable for floor transfer.





1.  Continue IVF


2.  Continue Insulin drip until Gap closes


3.  Continue NPO status


4.  IF gap closes with next lab draw, ok with feeding and transfer to floor vs 

discharge.  








CCT 31 minutes.





Subjective


Date of service: 07/01/22


Interval history: 





No acute events.  Gap closed now.





Objective





- Constitutional


Vitals: 


                               Vital Signs - 12hr











  06/30/22 07/01/22 07/01/22





  23:30 00:00 00:59


 


Temperature   99.3 F


 


Pulse Rate 98 H 113 H 


 


Pulse Rate [   





Bilateral   





Throughout]   


 


Respiratory  15 





Rate   


 


Respiratory   





Rate [Bilateral   





Throughout]   


 


Blood Pressure  133/89 


 


O2 Sat by Pulse  100 





Oximetry   














  07/01/22 07/01/22 07/01/22





  01:00 02:00 03:00


 


Temperature   


 


Pulse Rate 96 H 82 75


 


Pulse Rate [   





Bilateral   





Throughout]   


 


Respiratory 14 20 17





Rate   


 


Respiratory   





Rate [Bilateral   





Throughout]   


 


Blood Pressure 122/80 119/78 124/73


 


O2 Sat by Pulse 98 98 





Oximetry   














  07/01/22 07/01/22 07/01/22





  03:05 03:57 04:00


 


Temperature  98.3 F 


 


Pulse Rate 85  81


 


Pulse Rate [   





Bilateral   





Throughout]   


 


Respiratory   17





Rate   


 


Respiratory   





Rate [Bilateral   





Throughout]   


 


Blood Pressure   131/82


 


O2 Sat by Pulse   100





Oximetry   














  07/01/22 07/01/22 07/01/22





  05:00 06:09 07:00


 


Temperature   


 


Pulse Rate 73 86 89


 


Pulse Rate [   





Bilateral   





Throughout]   


 


Respiratory 15 14 16





Rate   


 


Respiratory   





Rate [Bilateral   





Throughout]   


 


Blood Pressure 125/85  141/99


 


O2 Sat by Pulse 99 100 100





Oximetry   














  07/01/22 07/01/22 07/01/22





  08:00 08:15 10:00


 


Temperature   


 


Pulse Rate 93 H  


 


Pulse Rate [  94 H 





Bilateral   





Throughout]   


 


Respiratory 10 L  





Rate   


 


Respiratory  28 H 





Rate [Bilateral   





Throughout]   


 


Blood Pressure 141/99  


 


O2 Sat by Pulse 100  100





Oximetry   














- Labs


CBC & Chem 7: 


                                 07/01/22 04:24





                                 07/01/22 04:24


Labs: 


                              Abnormal lab results











  06/30/22 06/30/22 06/30/22 Range/Units





  11:32 14:51 14:51 


 


WBC     (4.5-11.0)  K/mm3


 


Hgb     (11.8-15.2)  gm/dl


 


Hct     (35.5-45.6)  %


 


MCH     (28-32)  pg


 


Lymph % (Auto)     (13.4-35.0)  %


 


Potassium     (3.6-5.0)  mmol/L


 


Chloride     ()  mmol/L


 


Carbon Dioxide   15 L   (22-30)  mmol/L


 


BUN     (9-20)  mg/dL


 


Glucose   300 H   ()  mg/dL


 


POC Glucose  133 H    ()  mg/dL


 


Hemoglobin A1c    10.4 H  (4-6)  %


 


Calcium     (8.4-10.2)  mg/dL


 


Magnesium     (1.7-2.3)  mg/dL


 


Total Bilirubin     (0.1-1.2)  mg/dL


 


AST     (5-40)  units/L


 


ALT     (7-56)  units/L


 


Total Protein     (6.3-8.2)  g/dL


 


Albumin     (3.9-5)  g/dL














  06/30/22 06/30/22 06/30/22 Range/Units





  16:02 18:00 22:19 


 


WBC     (4.5-11.0)  K/mm3


 


Hgb     (11.8-15.2)  gm/dl


 


Hct     (35.5-45.6)  %


 


MCH     (28-32)  pg


 


Lymph % (Auto)     (13.4-35.0)  %


 


Potassium    3.0 L D  (3.6-5.0)  mmol/L


 


Chloride     ()  mmol/L


 


Carbon Dioxide    18 L  (22-30)  mmol/L


 


BUN    7 L  (9-20)  mg/dL


 


Glucose    136 H  ()  mg/dL


 


POC Glucose  379 H  170 H   ()  mg/dL


 


Hemoglobin A1c     (4-6)  %


 


Calcium    8.0 L  (8.4-10.2)  mg/dL


 


Magnesium     (1.7-2.3)  mg/dL


 


Total Bilirubin     (0.1-1.2)  mg/dL


 


AST     (5-40)  units/L


 


ALT     (7-56)  units/L


 


Total Protein     (6.3-8.2)  g/dL


 


Albumin     (3.9-5)  g/dL














  07/01/22 07/01/22 07/01/22 Range/Units





  00:56 04:24 04:24 


 


WBC   4.4 L   (4.5-11.0)  K/mm3


 


Hgb   10.0 L D   (11.8-15.2)  gm/dl


 


Hct   31.8 L D   (35.5-45.6)  %


 


MCH   27 L   (28-32)  pg


 


Lymph % (Auto)   38.8 H   (13.4-35.0)  %


 


Potassium    3.4 L  (3.6-5.0)  mmol/L


 


Chloride    107.2 H  ()  mmol/L


 


Carbon Dioxide    20 L  (22-30)  mmol/L


 


BUN    6 L  (9-20)  mg/dL


 


Glucose     ()  mg/dL


 


POC Glucose  130 H    ()  mg/dL


 


Hemoglobin A1c     (4-6)  %


 


Calcium    8.3 L  (8.4-10.2)  mg/dL


 


Magnesium    1.60 L  (1.7-2.3)  mg/dL


 


Total Bilirubin    1.30 H  (0.1-1.2)  mg/dL


 


AST    65 H  (5-40)  units/L


 


ALT    209 H  (7-56)  units/L


 


Total Protein    4.8 L D  (6.3-8.2)  g/dL


 


Albumin    2.8 L  (3.9-5)  g/dL














  07/01/22 Range/Units





  08:55 


 


WBC   (4.5-11.0)  K/mm3


 


Hgb   (11.8-15.2)  gm/dl


 


Hct   (35.5-45.6)  %


 


MCH   (28-32)  pg


 


Lymph % (Auto)   (13.4-35.0)  %


 


Potassium   (3.6-5.0)  mmol/L


 


Chloride   ()  mmol/L


 


Carbon Dioxide   (22-30)  mmol/L


 


BUN   (9-20)  mg/dL


 


Glucose   ()  mg/dL


 


POC Glucose  147 H  ()  mg/dL


 


Hemoglobin A1c   (4-6)  %


 


Calcium   (8.4-10.2)  mg/dL


 


Magnesium   (1.7-2.3)  mg/dL


 


Total Bilirubin   (0.1-1.2)  mg/dL


 


AST   (5-40)  units/L


 


ALT   (7-56)  units/L


 


Total Protein   (6.3-8.2)  g/dL


 


Albumin   (3.9-5)  g/dL














Medications & Allergies





- Medications


Allergies/Adverse Reactions: 


                                    Allergies





No Known Allergies Allergy (Unverified 02/13/17 08:22)


   








Home Medications: 


                                Home Medications











 Medication  Instructions  Recorded  Confirmed  Last Taken  Type


 


Insulin Lispro [HumaLOG VIAL] 0 units SQ AC 02/13/17 04/24/21 02/13/17 History


 


Insulin Detemir [Levemir VIAL] 25 units SQ DAILY #10 unit 12/08/20 04/24/21 

Unknown Rx


 


Amoxicillin/K Clav Tab [Augmentin 1 each PO Q12HR #10 tablet 04/25/21  Unknown 

Rx





875MG TAB]     


 


Insulin Glargine [Lantus VIAL] 30 units SUB-Q QAMDIAB #10 units 04/25/21  

Unknown Rx


 


Lispro Insulin [HumaLOG] 8 unit SUB-Q AC #10 ml 04/25/21  Unknown Rx











Active Medications: 














Generic Name Dose Route Start Last Admin





  Trade Name Freq  PRN Reason Stop Dose Admin


 


Acetaminophen  650 mg  06/30/22 01:17 





  Acetaminophen 325 Mg Tab  PO  





  Q4H PRN  





  Pain MILD(1-3)/Fever >100.5/HA  


 


Albuterol  2.5 mg  06/30/22 01:17 





  Albuterol 2.5 Mg/3 Ml Nebu  IH  





  Q3HRT PRN  





  Shortness Of Breath  


 


Albuterol/Ipratropium  1 ampul  07/01/22 08:00  07/01/22 08:14





  Ipratropium/Albuterol Sulfate 3 Ml Ampul.Neb  IH   1 ampul





  TIDRT NATY   Administration


 


Dextrose  50 ml  06/30/22 23:10 





  Dextrose 50% In Water (25gm) 50 Ml Syringe  IV  





  Q30MIN PRN  





  Hypoglycemia  





  Protocol  


 


Famotidine  20 mg  07/01/22 10:00  07/01/22 09:38





  Famotidine 20 Mg Tab  PO   20 mg





  BID NATY   Administration


 


Heparin Sodium (Porcine)  5,000 unit  06/30/22 10:00  07/01/22 09:38





  Heparin 5,000 Unit/1 Ml Vial  SUB-Q   5,000 unit





  Q12HR NATY   Administration


 


Magnesium Sulfate  2 gm in 50 mls @ 12.5 mls/hr  07/01/22 09:00  07/01/22 09:38





  Magnesium Sulfate 2gm/50ml  IV  07/01/22 13:00  12.5 mls/hr





  ONCE@0900 NATY   Administration


 


Insulin Glargine  25 units  07/01/22 22:00 





  Insulin Glargine 100 Units/Ml  SUB-Q  





  [facility]  


 


Insulin Human Lispro  0 unit  07/01/22 07:30  07/01/22 08:18





  Insulin Lispro 100 Unit/Ml  SUB-Q   Not Given





  [facility]  





  Protocol  


 


Morphine Sulfate  2 mg  06/30/22 01:17 





  Morphine 2 Mg/1 Ml Inj  IV  





  Q4H PRN  





  Pain, Moderate (4-6)  


 


Morphine Sulfate  4 mg  06/30/22 01:17 





  Morphine 4 Mg/1 Ml Inj  IV  





  Q4H PRN  





  Pain , Severe (7-10)  


 


Ondansetron HCl  4 mg  06/30/22 01:17 





  Ondansetron 4 Mg/2 Ml Inj  IV  





  Q8H PRN  





  Nausea And Vomiting  


 


Potassium Chloride  40 meq  07/01/22 08:45  07/01/22 09:38





  Potassium Chloride Er 20 Meq Tab  PO  07/01/22 12:45  40 meq





  ONCE@0845 NATY   Administration


 


Sodium Chloride  10 ml  06/30/22 10:00  06/30/22 20:59





  Sodium Chloride 0.9% 10 Ml Flush Syringe  IV   10 ml





  BID NATY   Administration


 


Sodium Chloride  10 ml  06/30/22 01:17 





  Sodium Chloride 0.9% 10 Ml Flush Syringe  IV  





  PRN PRN  





  LINE FLUSH

## 2022-07-01 NOTE — DISCHARGE SUMMARY
Providers





- Providers


Date of Admission: 


06/30/22 01:17





Date of discharge: 07/01/22


Attending physician: 


KATIE CASTELAN MD





                                        





06/29/22 23:48


Consult to Dietitian/Nutrition [CONS] Routine 


   Physician Instructions: 


   Reason For Exam: DKA


   Reason for Consult: Nutrition Recommendations


   Reason for Consult: Diet education





06/29/22 23:50


Consult to Physician [CONS] Urgent 


   Comment: 


   Consulting Provider: GUY FELICIANO


   Physician Instructions: 


   Reason For Exam: dka





06/30/22 01:17


Consult to Dietitian/Nutrition [CONS] Routine 


   Physician Instructions: 


   Reason For Exam: DKA


   Reason for Consult: Nutrition Recommendations


   Reason for Consult: Diet education





06/30/22 23:10


Consult to Dietitian/Nutrition [CONS] Routine 


   Physician Instructions: 


   Reason For Exam: 


   Reason for Consult: Diet education











Primary care physician: 


PRIMARY CARE MD








Hospitalization


Reason for admission: nausea.


Condition: Stable


Hospital course: 





This is a 22 year old male with type one DM who presented to the ED on 6/29 with

 inability to pee and weakness. Patient reports he ran out of his insulin lantus

 but administered 7 units of humalog prior to presentation as his blood glucose 

was high at [facility].  Work-up in the emergency 

department revealed glucose of 554, bicarb 4, anion gap 42, hyperkalemia, acute 

kidney injury, transaminitis.  Patient was admitted to the hospitalist service 

with consults to CCM on the DKA pathway.








Hospital course to date:





6/30: Patient received total of 3 LR boluses today.  Anion gap is improving 

however not closed yet.  P.m. BMP pending.  Insulin drip was turned off per 

protocol as blood glucose was less than 100.  Patient states that he received 

[employer] vaccine x2 for COVID-19.





7/1; Blood sugars optimized, VSS. GAP closed. will discharge patient home today.

 Recommend resume home lantus and humalog and to remain compliant. Also 

counseled on diabetic diet.





Neuro: NAD


-Reorientation as needed


-Maintain sleep-wake cycle


-aspiration/seizure precautions


-As needed analgesia





Cardiac: ST


-Blood pressure monitoring per protocol





Respiratory: NAD


-Supplemental oxygen as needed


-Currently on room air


-Pulmonary hygiene


-SPO2 monitor per protocol





GI: Transaminitis


-PPI


-N.p.o. for now


-BR: When appropriate


-Trend LFTs





: Acute kidney injury likely secondary to vasomotor nephropathy, hyperkalemia,

 hyperchloremia hyponatremia, severe metabolic acidosis


-Monitor intake and output


-Renally dose medications


-Avoid nephrotoxic medications


-S/p IV push bicarb


-Trend BMP





ID: NAD


-Monitor WBC and temperature curve





Endo: DKA, h/o IDDM


-Patient he ran out of Lantus


-Reports home Lantus and Humalog on SSI


-Presented with blood glucose 547, metabolic acidosis 4, potassium 5.7, sodium 

133


-VBG's 7.038


-Started on DKA pathway


-Currently on insulin drip


-Avoid hypoglycemia


-Transition to SSI and long-acting insulin when able


-Hemoglobin A1c pending





Heme: NAD


-Trend CBC


-Transfuse hemoglobin less than 7


-SCDs to BLE while in bed





Disposition: 01 HOME / SELF CARE / HOMELESS


Final Discharge Diagnosis (Prints w/discharge instructions): diabetic 

ketoacidosis.


Time spent for discharge: 35





Core Measure Documentation





- Palliative Care


Palliative Care/ Comfort Measures: Not Applicable





- Core Measures


Any of the following diagnoses?: none





Exam





- Physical Exam


Narrative exam: 





Physical Exam:


VITAL SIGNS:  Reviewed.    


GENERAL:  The patient appears normally developed, Vital signs as documented.


HEAD:  No signs of head trauma.


EYES:  Pupils are equal.  Extraocular motions intact.  


EARS:  Hearing grossly intact.


MOUTH:  Oropharynx is normal. 


NECK:  No adenopathy, no JVD.  


CHEST:  Chest with clear breath sounds bilaterally.  No wheezes, rales, or 

rhonchi.  


CARDIAC:  Regular rate and rhythm.  S1 and S2, without murmurs, gallops, or 

rubs.


VASCULAR:  No Edema.  Peripheral pulses normal and equal in all extremities.


ABDOMEN:  Soft, non tender and non distended.  No   rebound or guarding, and no 

masses palpated.   Bowel Sounds normal.


MUSCULOSKELETAL:  Good range of motion of all major joints. Extremities without 

clubbing, cyanosis or edema.  


NEUROLOGIC EXAM:  Alert and oriented x 4. no focal sensory or strength deficits.

  


PSYCHIATRIC:  Mood normal.


SKIN:  detail exam as documented in skin assessment





- Constitutional


Vitals: 


                                        











Temp Pulse Resp BP Pulse Ox


 


 98.3 F   86   25 H  133/90   100 


 


 07/01/22 03:57  07/01/22 12:00  07/01/22 12:00  07/01/22 12:00  07/01/22 10:00














Plan


Follow up with: 


PRIMARY CARE,MD [Primary Care Provider] - 7 Days

## 2022-09-28 ENCOUNTER — HOSPITAL ENCOUNTER (INPATIENT)
Dept: HOSPITAL 5 - ED | Age: 22
LOS: 2 days | Discharge: HOME | DRG: 871 | End: 2022-09-30
Attending: INTERNAL MEDICINE | Admitting: INTERNAL MEDICINE
Payer: SELF-PAY

## 2022-09-28 DIAGNOSIS — E11.9: ICD-10-CM

## 2022-09-28 DIAGNOSIS — R91.1: ICD-10-CM

## 2022-09-28 DIAGNOSIS — A41.9: Primary | ICD-10-CM

## 2022-09-28 DIAGNOSIS — E87.6: ICD-10-CM

## 2022-09-28 DIAGNOSIS — E87.5: ICD-10-CM

## 2022-09-28 DIAGNOSIS — E87.2: ICD-10-CM

## 2022-09-28 DIAGNOSIS — Z83.3: ICD-10-CM

## 2022-09-28 DIAGNOSIS — Z82.49: ICD-10-CM

## 2022-09-28 DIAGNOSIS — E11.10: ICD-10-CM

## 2022-09-28 DIAGNOSIS — J96.01: ICD-10-CM

## 2022-09-28 DIAGNOSIS — N17.9: ICD-10-CM

## 2022-09-28 DIAGNOSIS — J18.9: ICD-10-CM

## 2022-09-28 LAB
ALBUMIN SERPL-MCNC: 4.5 G/DL (ref 3.9–5)
ALT SERPL-CCNC: 30 UNITS/L (ref 7–56)
BAND NEUTROPHILS # (MANUAL): 0.8 K/MM3
BILIRUB UR QL STRIP: (no result)
BLOOD UR QL VISUAL: (no result)
BUN SERPL-MCNC: 19 MG/DL (ref 9–20)
BUN SERPL-MCNC: 21 MG/DL (ref 9–20)
BUN SERPL-MCNC: 26 MG/DL (ref 9–20)
BUN SERPL-MCNC: 29 MG/DL (ref 9–20)
BUN/CREAT SERPL: 11 %
BUN/CREAT SERPL: 13 %
BUN/CREAT SERPL: 13 %
BUN/CREAT SERPL: 14 %
CALCIUM SERPL-MCNC: 7 MG/DL (ref 8.4–10.2)
CALCIUM SERPL-MCNC: 7.4 MG/DL (ref 8.4–10.2)
CALCIUM SERPL-MCNC: 7.9 MG/DL (ref 8.4–10.2)
CALCIUM SERPL-MCNC: 9.4 MG/DL (ref 8.4–10.2)
HCO3 BLDA-SCNC: 2.7 MMOL/L (ref 20–26)
HCT VFR BLD CALC: 46.4 % (ref 35.5–45.6)
HEMOLYSIS INDEX: 11
HEMOLYSIS INDEX: 12
HEMOLYSIS INDEX: 16
HEMOLYSIS INDEX: 9
HGB BLD-MCNC: 13.4 GM/DL (ref 11.8–15.2)
MCHC RBC AUTO-ENTMCNC: 29 % (ref 32–34)
MCV RBC AUTO: 92 FL (ref 84–94)
MYELOCYTES # (MANUAL): 0.3 K/MM3
PCO2 BLDA: 12.2 MM HG
PH BLDA: 6.96 PH UNITS (ref 7.35–7.45)
PH UR STRIP: 5 [PH] (ref 5–7)
PLATELET # BLD: 246 K/MM3 (ref 140–440)
PO2 BLDA: 139.1 MM HG (ref 80–90)
PROMYELOCYTES # (MANUAL): 0 K/MM3
RBC # BLD AUTO: 5.06 M/MM3 (ref 3.65–5.03)
RBC #/AREA URNS HPF: < 1 /HPF (ref 0–6)
TOTAL CELLS COUNTED BLD: 100
UROBILINOGEN UR-MCNC: < 2 MG/DL (ref ?–2)
WBC #/AREA URNS HPF: < 1 /HPF (ref 0–6)

## 2022-09-28 PROCEDURE — 85007 BL SMEAR W/DIFF WBC COUNT: CPT

## 2022-09-28 PROCEDURE — 96375 TX/PRO/DX INJ NEW DRUG ADDON: CPT

## 2022-09-28 PROCEDURE — 80053 COMPREHEN METABOLIC PANEL: CPT

## 2022-09-28 PROCEDURE — 86900 BLOOD TYPING SEROLOGIC ABO: CPT

## 2022-09-28 PROCEDURE — 82805 BLOOD GASES W/O2 SATURATION: CPT

## 2022-09-28 PROCEDURE — 85027 COMPLETE CBC AUTOMATED: CPT

## 2022-09-28 PROCEDURE — 96374 THER/PROPH/DIAG INJ IV PUSH: CPT

## 2022-09-28 PROCEDURE — 87806 HIV AG W/HIV1&2 ANTB W/OPTIC: CPT

## 2022-09-28 PROCEDURE — 86901 BLOOD TYPING SEROLOGIC RH(D): CPT

## 2022-09-28 PROCEDURE — 85025 COMPLETE CBC W/AUTO DIFF WBC: CPT

## 2022-09-28 PROCEDURE — 82803 BLOOD GASES ANY COMBINATION: CPT

## 2022-09-28 PROCEDURE — 36415 COLL VENOUS BLD VENIPUNCTURE: CPT

## 2022-09-28 PROCEDURE — 99285 EMERGENCY DEPT VISIT HI MDM: CPT

## 2022-09-28 PROCEDURE — 81001 URINALYSIS AUTO W/SCOPE: CPT

## 2022-09-28 PROCEDURE — 80048 BASIC METABOLIC PNL TOTAL CA: CPT

## 2022-09-28 PROCEDURE — 87040 BLOOD CULTURE FOR BACTERIA: CPT

## 2022-09-28 PROCEDURE — 82140 ASSAY OF AMMONIA: CPT

## 2022-09-28 PROCEDURE — 84145 PROCALCITONIN (PCT): CPT

## 2022-09-28 PROCEDURE — 86140 C-REACTIVE PROTEIN: CPT

## 2022-09-28 PROCEDURE — 71250 CT THORAX DX C-: CPT

## 2022-09-28 PROCEDURE — 83735 ASSAY OF MAGNESIUM: CPT

## 2022-09-28 PROCEDURE — 71045 X-RAY EXAM CHEST 1 VIEW: CPT

## 2022-09-28 PROCEDURE — 82962 GLUCOSE BLOOD TEST: CPT

## 2022-09-28 PROCEDURE — 86850 RBC ANTIBODY SCREEN: CPT

## 2022-09-28 PROCEDURE — 84100 ASSAY OF PHOSPHORUS: CPT

## 2022-09-28 PROCEDURE — 36600 WITHDRAWAL OF ARTERIAL BLOOD: CPT

## 2022-09-28 RX ADMIN — SODIUM CHLORIDE SCH MLS/HR: 0.9 INJECTION, SOLUTION INTRAVENOUS at 10:32

## 2022-09-28 RX ADMIN — DEXTROSE, SODIUM CHLORIDE, AND POTASSIUM CHLORIDE SCH MLS/HR: 5; .45; .15 INJECTION INTRAVENOUS at 16:06

## 2022-09-28 RX ADMIN — Medication SCH ML: at 22:52

## 2022-09-28 RX ADMIN — INSULIN HUMAN SCH MLS/HR: 100 INJECTION, SOLUTION PARENTERAL at 12:46

## 2022-09-28 RX ADMIN — SODIUM CHLORIDE SCH: 0.9 INJECTION, SOLUTION INTRAVENOUS at 13:25

## 2022-09-28 RX ADMIN — HEPARIN SODIUM SCH UNIT: 5000 INJECTION, SOLUTION INTRAVENOUS; SUBCUTANEOUS at 22:51

## 2022-09-28 NOTE — EMERGENCY DEPARTMENT REPORT
HPI





- General


Chief Complaint: Hyperglycemia


Time Seen by Provider: 09/28/22 10:07





- HPI


HPI: 





Room 22











The patient is a 22-year-old male present with a chief complaint of weakness.  

The patient has a history of type 1 diabetes and states he ran out of his 

insulin 03: 00 this evening.  The patient states this morning when he awakened 

at 07:00 he was "feeling bad."  Patient states his symptoms consisted of 

nausea/vomiting, "breathing hard" and feeling too weak to get up.  Patient 

denies history of fever





ED Past Medical Hx





- Past Medical History


Hx Diabetes: Yes (Type I)


Additional medical history: Boil





- Surgical History


Past Surgical History?: No





- Family History


Family history: no significant





- Social History


Smoking Status: Never Smoker


Substance Use Type: None (Denies illicit drug use)





- Medications


Home Medications: 


                                Home Medications











 Medication  Instructions  Recorded  Confirmed  Last Taken  Type


 


Insulin Lispro [HumaLOG VIAL] 0 units SQ AC 02/13/17 04/24/21 02/13/17 History


 


Insulin Detemir [Levemir VIAL] 25 units SQ DAILY #10 unit 12/08/20 04/24/21 Unkn

own Rx


 


Amoxicillin/K Clav Tab [Augmentin 1 each PO Q12HR #10 tablet 04/25/21  Unknown 

Rx





875MG TAB]     


 


Insulin Glargine [Lantus VIAL] 30 units SUB-Q QAMDIAB #10 units 04/25/21  

Unknown Rx


 


Lispro Insulin [HumaLOG] 8 unit SUB-Q AC #10 ml 04/25/21  Unknown Rx














ED Review of Systems


ROS: 


Stated complaint: HYPERGLYCEMIC CRISIS


Other details as noted in HPI





Constitutional: denies: fever


Eyes: denies: eye pain


ENT: denies: throat pain


Respiratory: no symptoms reported


Cardiovascular: denies: chest pain


Endocrine: other (Kussmaul respirations)


Gastrointestinal: nausea, vomiting


Musculoskeletal: denies: back pain


Neurological: denies: headache





Physical Exam





- Physical Exam


Vital Signs: 


                                   Vital Signs











  09/28/22 09/28/22 09/28/22





  09:22 09:35 10:01


 


Temperature 98.7 F  


 


Pulse Rate 90  122 H


 


Respiratory 18  28 H





Rate   


 


Blood Pressure   192/101


 


Blood Pressure 179/124  





[Left]   


 


O2 Sat by Pulse 100 99 100





Oximetry   











Physical Exam: 





GENERAL: The patient is well-developed well-nourished male lying on stretcher 

exhibiting Kussmaul respirations. []


HEENT: Normocephalic.  Atraumatic.  Extraocular motions are intact.


NECK: Supple.  Trachea midline


CHEST/LUNGS: Clear to auscultation.  Kussmaul respirations


HEART/CARDIOVASCULAR: Regular.  There is no tachycardia.  There is no gallop rub

 or murmur.


ABDOMEN: Abdomen is soft, nontender.  Patient has normal bowel sounds.  There is

 no abdominal distention.


SKIN: There is no rash.  There is no edema.  There is no diaphoresis.


NEURO: The patient is awake, alert, and oriented.  The patient is cooperative.  

The patient has no focal neurologic deficits.  The patient has normal speech.  

GCS 15


MUSCULOSKELETAL: There is no evidence of acute injury.





ED Course


                                   Vital Signs











  09/28/22 09/28/22 09/28/22





  09:22 09:35 10:01


 


Temperature 98.7 F  


 


Pulse Rate 90  122 H


 


Respiratory 18  28 H





Rate   


 


Blood Pressure   192/101


 


Blood Pressure 179/124  





[Left]   


 


O2 Sat by Pulse 100 99 100





Oximetry   














ED Medical Decision Making





- Lab Data


Result diagrams: 


                                 09/28/22 10:21





                                 09/28/22 10:21





- Differential Diagnosis


DKA, hyperglycemia, hyperosmolar nonketotic state


Critical care attestation.: 


If time is entered above; I have spent that time in minutes in the direct care 

of this critically ill patient, excluding procedure time.








ED Disposition


Clinical Impression: 


 DKA (diabetic ketoacidoses)





Disposition: 09 ADMITTED AS INPATIENT


Is pt being admited?: Yes


Does the pt Need Aspirin: No


Condition: Serious

## 2022-09-28 NOTE — CAT SCAN REPORT
CT CHEST WITHOUT CONTRAST



INDICATION / CLINICAL INFORMATION:

dypsnea.



TECHNIQUE:

Axial CT images were obtained through the chest without contrast. All CT scans at this location are p
erformed using CT dose reduction for ALARA by means of automated exposure control. 



COMPARISON:

Same-day chest radiograph



FINDINGS:



THORACIC AORTA: No significant abnormality. 

CORONARY ARTERY CALCIFICATION: No significant calcification.

HEART: No significant abnormality.

MEDIASTINUM / BRODIE: No significant thoracic lymphadenopathy.

LUNGS/PLEURA: Mild centrilobular nodularity of the right lung apex. No focal airspace consolidation. 
No pleural effusion or pneumothorax.

ADDITIONAL CHEST FINDINGS: None.



UPPER ABDOMEN: Hepatic steatosis. No acute findings.



SKELETAL SYSTEM: No acute findings



IMPRESSION:



1. Mild centrilobular nodularity in the right lung apex likely reflects atypical infectious or inflam
matory process/pneumonitis. No focal consolidative pneumonia.

2. No other acute findings.

3. Hepatic steatosis.



Signer Name: Kenneth Yeh MD 

Signed: 9/28/2022 1:23 PM

Workstation Name: [email or web address]

## 2022-09-28 NOTE — XRAY REPORT
CHEST 1 VIEW 9/28/2022 11:13 AM



INDICATION / CLINICAL INFORMATION:

Dyspnea.



COMPARISON: 

One view of the chest from 12/4/2020.



FINDINGS:



SUPPORT DEVICES: None.

HEART / MEDIASTINUM: No significant abnormality. 

LUNGS / PLEURA: No significant pulmonary abnormality. No significant pleural effusion. No pneumothora
x. 



ADDITIONAL FINDINGS: No significant additional findings.



IMPRESSION:

1. No acute abnormality of the chest.



Signer Name: George Everett MD 

Signed: 9/28/2022 12:26 PM

Workstation Name: [email or web address]
PARENTS

## 2022-09-28 NOTE — HISTORY AND PHYSICAL REPORT
History of Present Illness


Chief complaint: 





I feel weak


History of present illness: 


21 YO Male with DM presents ED for evaluation.  Patient reports "I feel weak".  

Patient states that he had experienced generalized weakness and "feeling sick". 

Over the past 1 day.  Patient knowledges noncompliance with his insulin over the

past 24 hours.  Patient knowledges nausea, multiple episodes of vomiting, and 

shortness of breath with generalized weakness.  EMS was notified and upon 

arrival the patient was found to be in distress with serum glucose of 545.  

Patient transported to [facility] for further care and evaluation of the aforementioned

symptoms.  The patient was seen and evaluated emergency department.  All lab and

imaging studies reviewed.  Patient found to have a pulse oximetry of 89% on room

air which is consistent with acute hypoxemic respiratory failure, diabetic 

ketoacidosis, metabolic acidosis, NICOLÁS with ATN, as well as sepsis.  Patient 

admitted to ICU and initiated on sepsis protocol as well as DKA protocol.  

Patient also found to have a right lung nodule.  Patient denies fever, chills, 

chest pain, palpitation, productive cough, skin rash, recent contact, known 

exposure to COVID-19.  Prior admission on 6/30/2022 reviewed.  All medication 

listed at time of admission has been reconciled.  Advanced care planning 

conducted in ED.





Past History


Past Medical History: diabetes


Past Surgical History: No surgical history, Other (Reviewed)


Social history: single.  denies: smoking, alcohol abuse, prescription drug abu

se, IV drug use


Family history: diabetes, hypertension





Medications and Allergies


                                    Allergies











Allergy/AdvReac Type Severity Reaction Status Date / Time


 


No Known Allergies Allergy   Verified 09/28/22 12:01











                                Home Medications











 Medication  Instructions  Recorded  Confirmed  Last Taken  Type


 


Insulin Lispro [HumaLOG VIAL] 0 units SQ AC 02/13/17 09/28/22 02/13/17 History


 


Insulin Glargine [Lantus VIAL] 30 units SUB-Q QDAY 09/28/22 09/28/22 Unknown 

History











Active Meds: 


Active Medications





Sodium Chloride (Nacl 0.9% 1000 Ml)  1,000 mls @ 999 mls/hr IV ONCE@1030 NATY


   Stop: 09/28/22 13:00


   Last Admin: 09/28/22 10:32 Dose:  999 mls/hr


   


Sodium Chloride (Nacl 0.9% 1000 Ml)  1,000 mls @ 999 mls/hr IV ONCE@1130 NATY


   Stop: 09/28/22 15:00


   Last Admin: 09/28/22 10:32 Dose:  999 mls/hr


   











Review of Systems


Constitutional: fatigue, weakness, no weight loss, no weight gain, no fever, no 

chills


Ears, nose, mouth and throat: no ear pain, no ear discharge, no tinnitis, no 

decreased hearing, no nose pain


Cardiovascular: no chest pain, no orthopnea, no palpitations, no rapid/irregular

heart beat


Respiratory: no cough, no cough with sputum, no excessive sputum


Gastrointestinal: nausea, vomiting, no abdominal pain, no constipation, no c

hange in bowel habits, no hematemesis


Genitourinary Male: no hematuria, no flank pain, no discharge, no urinary 

frequency, no urinary hesitancy


Rectal: no pain, no incontinence, no bleeding


Musculoskeletal: no neck stiffness, no neck pain, no shooting arm pain, no arm 

numbness/tingling, no low back pain


Integumentary: no rash, no pruritis, no redness, no sores, no wounds, no 

jaundice, no blisters


Neurological: no head injury, no weakness, no parathesias, no numbness, no 

seizures, no syncope, no tremors, no ataxia


Psychiatric: no anxiety, no memory loss, no sleep disturbances, no hypersomnia


Endocrine: no cold intolerance, no polyphagia, no excessive thirst, no polyuria,

no nocturia, no excessive sweating


Hematologic/Lymphatic: no easy bruising, no easy bleeding





Exam





- Constitutional


Vitals: 


                                        











Temp Pulse Resp BP Pulse Ox


 


 98.7 F   122 H  28 H  192/101   100 


 


 09/28/22 09:22  09/28/22 10:01  09/28/22 10:01  09/28/22 10:01  09/28/22 10:01











General appearance: Present: mild distress





- EENT


Eyes: Present: PERRL


ENT: hearing intact, clear oral mucosa





- Neck


Neck: Present: supple, normal ROM





- Respiratory


Respiratory effort: normal


Respiratory: bilateral: diminished





- Cardiovascular


Rhythm: other (Tachycardia)


Heart Sounds: Present: S1 & S2





- Extremities


Extremities: no ischemia, No edema, normal temperature


Peripheral Pulses: abnormal (Capillary refill greater than 3.5 seconds)





- Abdominal


General gastrointestinal: Present: soft, non-tender, non-distended, normal bowel

sounds


Male genitourinary: Present: normal





- Integumentary


Integumentary: Present: dry, clammy, decreased turgor





- Musculoskeletal


Musculoskeletal: generalized weakness





- Psychiatric


Psychiatric: appropriate mood/affect, intact judgment & insight





- Neurologic


Neurologic: CNII-XII intact, moves all extremities





Results





- Labs


CBC & Chem 7: 


                                 09/28/22 10:21





                                 09/28/22 14:04


Labs: 


                              Abnormal lab results











  09/28/22 09/28/22 09/28/22 Range/Units





  10:21 10:21 10:21 


 


WBC  25.7 H    (4.5-11.0)  K/mm3


 


RBC  5.06 H    (3.65-5.03)  M/mm3


 


Hct  46.4 H    (35.5-45.6)  %


 


MCH  27 L    (28-32)  pg


 


MCHC  29 L    (32-34)  %


 


VBG pH    6.884 L*  (7.320-7.420)  


 


Sodium   131 L   (137-145)  mmol/L


 


Potassium   6.9 H*   (3.6-5.0)  mmol/L


 


Chloride   88.7 L   ()  mmol/L


 


Carbon Dioxide   4 L*   (22-30)  mmol/L


 


BUN   29 H   (9-20)  mg/dL


 


Creatinine   2.2 H   (0.8-1.3)  mg/dL


 


Glucose   612 H*   ()  mg/dL


 


Alkaline Phosphatase   134 H   ()  units/L














Assessment and Plan





- Patient Problems


(1) Sepsis


Current Visit: Yes   Status: Acute   


Qualifiers: 


   Acute renal failure type: with acute tubular necrosis 


Plan to address problem: 


Sepsis protocol: Chest x-ray, CBC, CMP, urinalysis, IV fluid resuscitation 

therapy, IV antibiotic therapy, blood culture, serial lactic acid level, 

maintain mean arterial pressure greater than equal to 65,





The high probability of a clinically significant, sudden or life threatening 

deterioration of the [cardiac, pulmonary, renal, neuro, infectious disease, 

endocrine] system(s) required my full and direct attention, intervention and 

personal management. The aggregate critical care time was [95] minutes. This 

time is in addition to time spent performing reported procedures but includes 

the following: 





[x] Data Review and interpretation 





[x] Patient assessment and monitoring of vital signs 





[x] Documentation 





[x] Medication orders and management








(2) DKA (diabetic ketoacidosis)


Current Visit: Yes   Status: Acute   


Qualifiers: 


   Diabetes mellitus type: type 1 


Plan to address problem: 


DKA protocol: IV fluid resuscitation therapy, insulin drip, serial BMP, monitor 

anion gap, potassium repletion as per protocol, monitor fluid balance,








(3) Metabolic acidosis


Current Visit: Yes   Status: Acute   


Plan to address problem: 


IV for resuscitation therapy, IV bicarbonate therapy, BMP, repeat BMP in a.m.








(4) Acute kidney injury (NICOLÁS) with acute tubular necrosis (ATN)


Current Visit: Yes   Status: Acute   


Plan to address problem: 


IV fluid resuscitation therapy, monitor urine output every shift, monitor fluid 

balance, repeat BMP in a.m. to monitor serum creatinine/GFR.








(5) Lung nodule


Current Visit: Yes   Status: Acute   


Plan to address problem: 


Pulmonary team consulted, CT scan chest.








(6) Acute hypoxemic respiratory failure


Current Visit: Yes   Status: Acute   


Plan to address problem: 


Chest x-ray, supplemental oxygen, pulse oximetry, nebulizer therapy, CT scan 

chest, pulmonary toilet.








(7) DVT prophylaxis


Current Visit: Yes   Status: Acute   


Plan to address problem: 


SCD to bilateral lower extremities while in bed, prophylactic anticoagulation








(8) Advance care planning


Current Visit: Yes   Status: Acute   


Plan to address problem: 


Disease education done, care plan discussed, diagnosis discussed, patient is 

full code.  +30 minutes.








(9) Preventative health care


Current Visit: Yes   Status: Acute   


Plan to address problem: 


Patient counseled regarding risk reduction, outpatient follow-up with primary 

care physician for all age and risk factor appropriate screening test.  +30 

minutes.

## 2022-09-29 LAB
ALBUMIN SERPL-MCNC: 3.8 G/DL (ref 3.9–5)
ALT SERPL-CCNC: 22 UNITS/L (ref 7–56)
BAND NEUTROPHILS # (MANUAL): 0 K/MM3
BUN SERPL-MCNC: (no result) MG/DL (ref 9–20)
BUN SERPL-MCNC: 11 MG/DL (ref 9–20)
BUN SERPL-MCNC: 15 MG/DL (ref 9–20)
BUN SERPL-MCNC: 8 MG/DL (ref 9–20)
BUN/CREAT SERPL: (no result) %
BUN/CREAT SERPL: 6 %
BUN/CREAT SERPL: 7 %
BUN/CREAT SERPL: 8 %
CALCIUM SERPL-MCNC: (no result) MG/DL (ref 8.4–10.2)
CALCIUM SERPL-MCNC: 8.1 MG/DL (ref 8.4–10.2)
CALCIUM SERPL-MCNC: 8.4 MG/DL (ref 8.4–10.2)
CALCIUM SERPL-MCNC: 8.4 MG/DL (ref 8.4–10.2)
HCT VFR BLD CALC: 41.9 % (ref 35.5–45.6)
HEMOLYSIS INDEX: (no result)
HEMOLYSIS INDEX: 38
HEMOLYSIS INDEX: 6
HEMOLYSIS INDEX: 8
HGB BLD-MCNC: 12.5 GM/DL (ref 11.8–15.2)
MCHC RBC AUTO-ENTMCNC: 30 % (ref 32–34)
MCV RBC AUTO: 88 FL (ref 84–94)
MYELOCYTES # (MANUAL): 0 K/MM3
PLATELET # BLD: 155 K/MM3 (ref 140–440)
PROMYELOCYTES # (MANUAL): 0 K/MM3
RBC # BLD AUTO: 4.77 M/MM3 (ref 3.65–5.03)
TOTAL CELLS COUNTED BLD: 100

## 2022-09-29 RX ADMIN — Medication SCH ML: at 10:19

## 2022-09-29 RX ADMIN — FAMOTIDINE SCH MG: 10 INJECTION, SOLUTION INTRAVENOUS at 14:08

## 2022-09-29 RX ADMIN — HEPARIN SODIUM SCH UNIT: 5000 INJECTION, SOLUTION INTRAVENOUS; SUBCUTANEOUS at 23:32

## 2022-09-29 RX ADMIN — SODIUM BICARBONATE SCH MLS/HR: 84 INJECTION, SOLUTION INTRAVENOUS at 15:48

## 2022-09-29 RX ADMIN — INSULIN HUMAN SCH MLS/HR: 100 INJECTION, SOLUTION PARENTERAL at 14:04

## 2022-09-29 RX ADMIN — Medication SCH ML: at 23:32

## 2022-09-29 RX ADMIN — HEPARIN SODIUM SCH UNIT: 5000 INJECTION, SOLUTION INTRAVENOUS; SUBCUTANEOUS at 10:21

## 2022-09-29 NOTE — PROGRESS NOTE
<STEPHANIE KAMARA - Last Filed: 09/29/22 16:33>





Assessment and Plan


Assessment and plan: 


This is a 22-year-old male with known past medical history of type 1 Diabetes 

[facility] admitted for DKA and sepsis





Hospital Course to Date:


9/29: remains in severe metabolic acidosis this am, on DKA protocol and now on 

bcarb gtt. Additional IVF bolus administered, continue insulin gtt and NaBcarb 

gtt. Will transition to subq insulin once acidosis resolved and anion gap is 

closed. Continue current IV abx for pneumonitis, patient remains afebrile and 

stable on RA. Blood cultures pending, check CRP and procal. CCM is also 

following.











Assessment and Plan


#DKA (diabetic ketoacidosis)


#Anion Gap Metabolic Acidosis


#Insulin-dependent diabetes mellitus(IDDM)


- A1c 10.4 from 6/2022


- On DKA protocol


- still with severe acidosis this am, now on sodium Bcarb gtt


- Additional IVF bolus administered 


- Continue insulin gtt and IVF resuscitation per protocol


- Monitor and replace electrolytes as needed 


- Monitor anion gap, serial Labs ordered


- CCM is following


- Of note: Patient reported, he was on Lantus and SSI and recently lost his 

insurance and unable to afford it. Was switched to 70/30 BID a couple weeks ago





#Acute Kidney Injury([place]) Vasomotor Nephropathy


#Hyperkalemia


- most likely related to DKA/Dehydration


- Continue IVF resuscitation


- Strict intake and output


- Avoid nephrotoxic medications; Renally dose medications 


- Monitor and replace electrolytes as needed 


- Serial Labs ordered





#Nausea/Vomiting


- most likely 2/2 to above


- Denied any N/V, no abdominal pain


- PRN antiemetic for N/V





#Acute Hypoxic Respiratory Failure


#Pneumonitis


#Possible Aspiration


- Presented with SPO2 in 89% on RA


- CT chest suggest possible atypical infectious or inflammatory 

process/pneumonitis


- Stable on RA this am, probably aspiration due to N/V


- Continue empiric IV abx, PRN O2 supplementation


- Continue O2 monitoring for SPO2 goal above 95%


- Aspiration precaution





#Sepsis


#Leukocytosis


#Pneumonitis


#Possible Aspiration


- CT chest suggest possible atypical infectious or inflammatory 

process/pneumonitis


- Patient remains afebrile, stable on RA, VSS.


- Blood cultures pending


- Continue current IV abx- Levofloxacin and Vanc


- Check CRP and Procal


- F/U on cultures


- Daily CBC monitor





#GI/DVT Prophylaxis


- PPI- Pepcid


- Heparin subQ


- SCD to bilateral lower extremities while in bed





#Advance Care Planning 


- Disease education data, care plan, diagnoses, and prognosis were discussed 

with the patient at the bedside. Patient is a FULL code.  Patient acknowledged 

understanding and agreed with current care plan.





 


The high probability of a clinically significant, sudden or life threatening 

deterioration of the [multiple] system(s) required my full and direct attention,

 intervention and personal management. The aggregate critical care time was [60]

 minutes. This time is in addition to time spent performing reported procedures 

but includes the following: 





[x] Data Review and interpretation 





[x] Patient assessment and monitoring of vital signs 





[x] Documentation 





[x] Medication orders and management





Disposition Plan: ICU


Total Time Spent with Patient (Minutes): 60





History


Interval history: 


Patient seen and examined at the bedside. Fully AAO, on RA, denied any pain nor 

any discomfort. N/V and abdominal pain resolved. Patient remains on the DKA 

protocol, now on sodium Bcarb gtt, VSS. JARED overnight





Hospitalist Physical





- Constitutional


Vitals: 


                                        











Temp Pulse Resp BP Pulse Ox


 


 98.4 F   112 H  27 H  139/89   100 


 


 09/29/22 04:00  09/29/22 06:11  09/29/22 06:11  09/29/22 06:11  09/29/22 06:11











General appearance: Present: no acute distress, well-nourished, obese





- EENT


Eyes: Present: PERRL, EOM intact


ENT: hearing intact, clear oral mucosa





- Neck


Neck: Present: normal ROM





- Respiratory


Respiratory effort: normal


Respiratory: bilateral: CTA





- Cardiovascular


Rhythm: regular


Heart Sounds: Present: S1 & S2





- Extremities


Extremities: no ischemia, pulses intact, pulses symmetrical


Peripheral Pulses: within normal limits





- Abdominal


General gastrointestinal: soft, non-distended, normal bowel sounds





- Integumentary


Integumentary: Present: clear, warm, dry





- Psychiatric


Psychiatric: appropriate mood/affect, cooperative





- Neurologic


Neurologic: CNII-XII intact, moves all extremities





- Allied Health


Allied health notes reviewed: nursing, case management





Results





- Labs


CBC & Chem 7: 


                                 09/29/22 03:45





                                 09/29/22 11:53


Labs: 


                             Laboratory Last Values











WBC  22.2 K/mm3 (4.5-11.0)  H  09/29/22  03:45    


 


RBC  4.77 M/mm3 (3.65-5.03)   09/29/22  03:45    


 


Hgb  12.5 gm/dl (11.8-15.2)   09/29/22  03:45    


 


Hct  41.9 % (35.5-45.6)   09/29/22  03:45    


 


MCV  88 fl (84-94)   09/29/22  03:45    


 


MCH  26 pg (28-32)  L  09/29/22  03:45    


 


MCHC  30 % (32-34)  L  09/29/22  03:45    


 


RDW  14.1 % (13.2-15.2)   09/29/22  03:45    


 


Plt Count  155 K/mm3 (140-440)   09/29/22  03:45    


 


Add Manual Diff  Complete   09/29/22  03:45    


 


Total Counted  100   09/29/22  03:45    


 


Seg Neutrophils %  Np   09/29/22  03:45    


 


Seg Neuts % (Manual)  95.0 % (40.0-70.0)  H  09/29/22  03:45    


 


Band Neutrophils %  0 %  09/29/22  03:45    


 


Lymphocytes % (Manual)  4.0 % (13.4-35.0)  L  09/29/22  03:45    


 


Reactive Lymphs % (Man)  0 %  09/29/22  03:45    


 


Monocytes % (Manual)  1.0 % (0.0-7.3)   09/29/22  03:45    


 


Eosinophils % (Manual)  0 % (0.0-4.3)   09/29/22  03:45    


 


Basophils % (Manual)  0 % (0.0-1.8)   09/29/22  03:45    


 


Metamyelocytes %  0 %  09/29/22  03:45    


 


Myelocytes %  0 %  09/29/22  03:45    


 


Promyelocytes %  0 %  09/29/22  03:45    


 


Blast Cells %  0 %  09/29/22  03:45    


 


Nucleated RBC %  Not Reportable   09/29/22  03:45    


 


Seg Neutrophils # Man  21.1 K/mm3 (1.8-7.7)  H  09/29/22  03:45    


 


Band Neutrophils #  0.0 K/mm3  09/29/22  03:45    


 


Lymphocytes # (Manual)  0.9 K/mm3 (1.2-5.4)  L  09/29/22  03:45    


 


Abs React Lymphs (Man)  0.0 K/mm3  09/29/22  03:45    


 


Monocytes # (Manual)  0.2 K/mm3 (0.0-0.8)   09/29/22  03:45    


 


Eosinophils # (Manual)  0.0 K/mm3 (0.0-0.4)   09/29/22  03:45    


 


Basophils # (Manual)  0.0 K/mm3 (0.0-0.1)   09/29/22  03:45    


 


Metamyelocytes #  0.0 K/mm3  09/29/22  03:45    


 


Myelocytes #  0.0 K/mm3  09/29/22  03:45    


 


Promyelocytes #  0.0 K/mm3  09/29/22  03:45    


 


Blast Cells #  0.0 K/mm3  09/29/22  03:45    


 


WBC Morphology  Not Reportable   09/29/22  03:45    


 


Hypersegmented Neuts  Not Reportable   09/29/22  03:45    


 


Hyposegmented Neuts  Not Reportable   09/29/22  03:45    


 


Hypogranular Neuts  Not Reportable   09/29/22  03:45    


 


Smudge Cells  Not Reportable   09/29/22  03:45    


 


Toxic Granulation  Not Reportable   09/29/22  03:45    


 


Toxic Vacuolation  Not Reportable   09/29/22  03:45    


 


Dohle Bodies  Not Reportable   09/29/22  03:45    


 


Pelger-Huet Anomaly  Not Reportable   09/29/22  03:45    


 


Radha Rods  Not Reportable   09/29/22  03:45    


 


Platelet Estimate  Consistent w auto   09/29/22  03:45    


 


Clumped Platelets  Not Reportable   09/29/22  03:45    


 


Plt Clumps, EDTA  Not Reportable   09/29/22  03:45    


 


Large Platelets  Not Reportable   09/29/22  03:45    


 


Giant Platelets  Not Reportable   09/29/22  03:45    


 


Platelet Satelliting  Not Reportable   09/29/22  03:45    


 


Plt Morphology Comment  Not Reportable   09/29/22  03:45    


 


RBC Morphology  Not Reportable   09/29/22  03:45    


 


Dimorphic RBCs  Not Reportable   09/29/22  03:45    


 


Polychromasia  Not Reportable   09/29/22  03:45    


 


Hypochromasia  Not Reportable   09/29/22  03:45    


 


Poikilocytosis  Not Reportable   09/29/22  03:45    


 


Anisocytosis  Not Reportable   09/29/22  03:45    


 


Microcytosis  Not Reportable   09/29/22  03:45    


 


Macrocytosis  Not Reportable   09/29/22  03:45    


 


Spherocytes  Not Reportable   09/29/22  03:45    


 


Pappenheimer Bodies  Not Reportable   09/29/22  03:45    


 


Sickle Cells  Not Reportable   09/29/22  03:45    


 


Target Cells  Not Reportable   09/29/22  03:45    


 


Tear Drop Cells  Not Reportable   09/29/22  03:45    


 


Ovalocytes  Not Reportable   09/29/22  03:45    


 


Helmet Cells  Not Reportable   09/29/22  03:45    


 


[place] Bodies  Not Reportable   09/29/22  03:45    


 


Cabot Rings  Not Reportable   09/29/22  03:45    


 


Lizy Cells  Not Reportable   09/29/22  03:45    


 


Bite Cells  Not Reportable   09/29/22  03:45    


 


Crenated Cell  Not Reportable   09/29/22  03:45    


 


Elliptocytes  Not Reportable   09/29/22  03:45    


 


Acanthocytes (Spur)  Not Reportable   09/29/22  03:45    


 


Rouleaux  Not Reportable   09/29/22  03:45    


 


Hemoglobin C Crystals  Not Reportable   09/29/22  03:45    


 


Schistocytes  Not Reportable   09/29/22  03:45    


 


Malaria parasites  Not Reportable   09/29/22  03:45    


 


Misha Bodies  Not Reportable   09/29/22  03:45    


 


Hem Pathologist Commnt  No   09/29/22  03:45    


 


ABG pH  6.964 pH Units (7.350-7.450)  L*  09/28/22  14:38    


 


ABG pCO2  12.2 mm Hg  09/28/22  14:38    


 


ABG pO2  139.1 mm Hg (80.0-90.0)  H  09/28/22  14:38    


 


ABG HCO3  2.7 mmol/L (20.0-26.0)  L  09/28/22  14:38    


 


ABG O2 Saturation  97.9 % (95.0-99.0)   09/28/22  14:38    


 


ABG O2 Content  17.3  (0.0-44)   09/28/22  14:38    


 


ABG Base Excess  -27.4 mmol/L (-2.0-3.0)  L  09/28/22  14:38    


 


ABG Hemoglobin  12.7 gm/dl (14.0-18.0)  L  09/28/22  14:38    


 


ABG Carboxyhemoglobin  1.7 % (0.0-5.0)   09/28/22  14:38    


 


ABG Methemoglobin  0.7 % (0.0-1.5)   09/28/22  14:38    


 


VBG pH  6.884  (7.320-7.420)  L*  09/28/22  10:21    


 


Oxyhemoglobin  95.5 % (95.0-99.0)   09/28/22  14:38    


 


FiO2  21 %  09/28/22  14:38    


 


Sodium  141 mmol/L (137-145)   09/29/22  03:45    


 


Potassium  4.6 mmol/L (3.6-5.0)   09/29/22  03:45    


 


Chloride  108.9 mmol/L ()  H  09/29/22  03:45    


 


Carbon Dioxide  3 mmol/L (22-30)  L*  09/29/22  03:45    


 


Anion Gap  34 mmol/L  09/29/22  03:45    


 


BUN  15 mg/dL (9-20)   09/29/22  03:45    


 


Creatinine  2.0 mg/dL (0.8-1.3)  H  09/29/22  03:45    


 


Estimated GFR  51 ml/min  09/29/22  03:45    


 


BUN/Creatinine Ratio  8 %  09/29/22  03:45    


 


Glucose  152 mg/dL ()  H  09/29/22  03:45    


 


POC Glucose  128 mg/dL ()  H  09/29/22  05:17    


 


Lactic Acid  1.40 mmol/L (0.7-2.0)   09/28/22  17:52    


 


Calcium  8.1 mg/dL (8.4-10.2)  L  09/29/22  03:45    


 


Phosphorus  3.20 mg/dL (2.5-4.5)  D 09/29/22  03:45    


 


Magnesium  1.90 mg/dL (1.7-2.3)   09/29/22  03:45    


 


Total Bilirubin  0.80 mg/dL (0.1-1.2)   09/29/22  03:45    


 


AST  21 units/L (5-40)   09/29/22  03:45    


 


ALT  22 units/L (7-56)   09/29/22  03:45    


 


Alkaline Phosphatase  97 units/L ()   09/29/22  03:45    


 


Total Protein  5.9 g/dL (6.3-8.2)  L  09/29/22  03:45    


 


Albumin  3.8 g/dL (3.9-5)  L  09/29/22  03:45    


 


Albumin/Globulin Ratio  1.8 %  09/29/22  03:45    


 


Urine Color  Colorless  (Yellow)   09/28/22  Unknown


 


Urine Turbidity  Clear  (Clear)   09/28/22  Unknown


 


Urine pH  5.0  (5.0-7.0)   09/28/22  Unknown


 


Ur Specific Gravity  1.015  (1.003-1.030)   09/28/22  Unknown


 


Urine Protein  100 mg/dl mg/dL (Negative)   09/28/22  Unknown


 


Urine Glucose (UA)  >=500 mg/dL (Negative)   09/28/22  Unknown


 


Urine Ketones  80 mg/dL (Negative)   09/28/22  Unknown


 


Urine Blood  Sm  (Negative)   09/28/22  Unknown


 


Urine Nitrite  Neg  (Negative)   09/28/22  Unknown


 


Urine Bilirubin  Neg  (Negative)   09/28/22  Unknown


 


Urine Urobilinogen  < 2 mg/dL (<2.0)   09/28/22  Unknown


 


Ur Leukocyte Esterase  Neg  (Negative)   09/28/22  Unknown


 


Urine WBC (Auto)  < 1.0 /HPF (0.0-6.0)   09/28/22  Unknown


 


Urine RBC (Auto)  < 1.0 /HPF (0.0-6.0)   09/28/22  Unknown


 


Amorphous Crystals  Few   09/28/22  Unknown


 


HIV 1&2 Antibody Rapid  Non react  (Non React)   09/29/22  03:45    


 


HIV P24 Antigen  Non react  (Non React)   09/29/22  03:45    


 


Blood Type  AB POSITIVE   09/28/22  13:40    


 


Antibody Screen  Negative   09/28/22  13:40    











Microbiology: 


Microbiology





09/28/22 14:40   Peripheral/Venous   Blood Culture - Preliminary


                            Culture in Progress


09/28/22 14:40   Peripheral/Venous   Blood Culture - Preliminary


                            Culture in Progress








Cobos/IV: 


                                        





Voiding Method                   Urinal











Active Medications





- Current Medications


Current Medications: 














Generic Name Dose Route Start Last Admin





  Trade Name Freq  PRN Reason Stop Dose Admin


 


Acetaminophen  650 mg  09/28/22 11:49 





  Acetaminophen 325 Mg Tab  PO  





  Q6H PRN  





  Pain MILD(1-3)/Fever >100.5/HA  


 


Albuterol  2.5 mg  09/28/22 11:49 





  Albuterol 2.5 Mg/3 Ml Nebu  IH  





  Q3HRT PRN  





  Shortness Of Breath  


 


Famotidine  20 mg  09/29/22 10:00 





  Famotidine 20 Mg/2 Ml Inj  IV  





  QDAY NATY  


 


Heparin Sodium (Porcine)  5,000 unit  09/28/22 22:00  09/29/22 10:21





  Heparin 5,000 Unit/1 Ml Vial  SUB-Q   5,000 unit





  Q12HR NATY   Administration


 


Hydromorphone HCl  0.25 mg  09/28/22 11:49 





  Hydromorphone 0.5 Mg/0.5 Ml Inj  IV  





  Q4H PRN  





  Pain, Moderate (4-6)  


 


Hydromorphone HCl  0.5 mg  09/28/22 11:49 





  Hydromorphone 0.5 Mg/0.5 Ml Inj  IV  





  Q13H PRN  





  Pain , Severe (7-10)  


 


Levofloxacin/Dextrose  750 mg in 150 mls @ 100 mls/hr  09/28/22 12:00  09/28/22 

16:59





  Levaquin 750mg/150ml  IV   Infused





  Q24H NATY   Infusion





  Protocol  


 


Insulin Human Regular 100  100 mls @ 1 mls/hr  09/28/22 13:00  09/29/22 09:30





  units/ Sodium Chloride  IV   0 units/hr





  TITR NATY   0 mls/hr





    Titration





  Protocol  





  1 UNITS/HR  


 


Potassium Chloride/Dextrose/Sod Cl  20 meq in 1,000 mls @ 125 mls/hr  09/28/22 

16:00  09/28/22 16:06





  D5w/0.45% Nacl/Kcl 20 Meq  IV   125 mls/hr





  AS DIRECT NATY   Administration


 


Sodium Bicarbonate 100 meq/  1,100 mls @ 100 mls/hr  09/29/22 08:33 





  Sodium Chloride  IV  





  AS DIRECT NATY  


 


Oxycodone/Acetaminophen  1 tab  09/28/22 11:49 





  Oxycodone /Acetaminophen 5-325mg Tab  PO  





  Q6H PRN  





  Pain, Moderate (4-6)  


 


Sodium Chloride  10 ml  09/28/22 22:00  09/29/22 10:19





  Sodium Chloride 0.9% 10 Ml Flush Syringe  IV   10 ml





  BID NATY   Administration


 


Sodium Chloride  10 ml  09/28/22 11:49 





  Sodium Chloride 0.9% 10 Ml Flush Syringe  IV  





  PRN PRN  





  LINE FLUSH  














<OKEH,VINCE DAVID - Last Filed: 09/30/22 07:22>





Assessment and Plan


Assessment and plan: 


I saw and evaluated the patient. I agree with the findings and the plan of care 

as documented in the Nurse Practitioner's~note, with the following corrections 

and additions.











Hospitalist Physical





- Constitutional


Vitals: 


                                        











Temp Pulse Resp BP Pulse Ox


 


 99.9 F H  94 H  20   123/75   98 


 


 09/30/22 03:23  09/30/22 06:00  09/30/22 06:00  09/30/22 06:00  09/30/22 06:00














Results





- Labs


CBC & Chem 7: 


                                 09/30/22 04:20





                                 09/30/22 04:20


Labs: 


                             Laboratory Last Values











WBC  10.7 K/mm3 (4.5-11.0)   09/30/22  04:20    


 


RBC  4.12 M/mm3 (3.65-5.03)   09/30/22  04:20    


 


Hgb  11.2 gm/dl (11.8-15.2)  L  09/30/22  04:20    


 


Hct  34.1 % (35.5-45.6)  L D 09/30/22  04:20    


 


MCV  83 fl (84-94)  L  09/30/22  04:20    


 


MCH  27 pg (28-32)  L  09/30/22  04:20    


 


MCHC  33 % (32-34)   09/30/22  04:20    


 


RDW  14.6 % (13.2-15.2)   09/30/22  04:20    


 


Plt Count  127 K/mm3 (140-440)  L  09/30/22  04:20    


 


Add Manual Diff  Complete   09/29/22  03:45    


 


Total Counted  100   09/29/22  03:45    


 


Seg Neutrophils %  Np   09/29/22  03:45    


 


Seg Neuts % (Manual)  95.0 % (40.0-70.0)  H  09/29/22  03:45    


 


Band Neutrophils %  0 %  09/29/22  03:45    


 


Lymphocytes % (Manual)  4.0 % (13.4-35.0)  L  09/29/22  03:45    


 


Reactive Lymphs % (Man)  0 %  09/29/22  03:45    


 


Monocytes % (Manual)  1.0 % (0.0-7.3)   09/29/22  03:45    


 


Eosinophils % (Manual)  0 % (0.0-4.3)   09/29/22  03:45    


 


Basophils % (Manual)  0 % (0.0-1.8)   09/29/22  03:45    


 


Metamyelocytes %  0 %  09/29/22  03:45    


 


Myelocytes %  0 %  09/29/22  03:45    


 


Promyelocytes %  0 %  09/29/22  03:45    


 


Blast Cells %  0 %  09/29/22  03:45    


 


Nucleated RBC %  Not Reportable   09/29/22  03:45    


 


Seg Neutrophils # Man  21.1 K/mm3 (1.8-7.7)  H  09/29/22  03:45    


 


Band Neutrophils #  0.0 K/mm3  09/29/22  03:45    


 


Lymphocytes # (Manual)  0.9 K/mm3 (1.2-5.4)  L  09/29/22  03:45    


 


Abs React Lymphs (Man)  0.0 K/mm3  09/29/22  03:45    


 


Monocytes # (Manual)  0.2 K/mm3 (0.0-0.8)   09/29/22  03:45    


 


Eosinophils # (Manual)  0.0 K/mm3 (0.0-0.4)   09/29/22  03:45    


 


Basophils # (Manual)  0.0 K/mm3 (0.0-0.1)   09/29/22  03:45    


 


Metamyelocytes #  0.0 K/mm3  09/29/22  03:45    


 


Myelocytes #  0.0 K/mm3  09/29/22  03:45    


 


Promyelocytes #  0.0 K/mm3  09/29/22  03:45    


 


Blast Cells #  0.0 K/mm3  09/29/22  03:45    


 


WBC Morphology  Not Reportable   09/29/22  03:45    


 


Hypersegmented Neuts  Not Reportable   09/29/22  03:45    


 


Hyposegmented Neuts  Not Reportable   09/29/22  03:45    


 


Hypogranular Neuts  Not Reportable   09/29/22  03:45    


 


Smudge Cells  Not Reportable   09/29/22  03:45    


 


Toxic Granulation  Not Reportable   09/29/22  03:45    


 


Toxic Vacuolation  Not Reportable   09/29/22  03:45    


 


Dohle Bodies  Not Reportable   09/29/22  03:45    


 


Pelger-Huet Anomaly  Not Reportable   09/29/22  03:45    


 


Radha Rods  Not Reportable   09/29/22  03:45    


 


Platelet Estimate  Consistent w auto   09/29/22  03:45    


 


Clumped Platelets  Not Reportable   09/29/22  03:45    


 


Plt Clumps, EDTA  Not Reportable   09/29/22  03:45    


 


Large Platelets  Not Reportable   09/29/22  03:45    


 


Giant Platelets  Not Reportable   09/29/22  03:45    


 


Platelet Satelliting  Not Reportable   09/29/22  03:45    


 


Plt Morphology Comment  Not Reportable   09/29/22  03:45    


 


RBC Morphology  Not Reportable   09/29/22  03:45    


 


Dimorphic RBCs  Not Reportable   09/29/22  03:45    


 


Polychromasia  Not Reportable   09/29/22  03:45    


 


Hypochromasia  Not Reportable   09/29/22  03:45    


 


Poikilocytosis  Not Reportable   09/29/22  03:45    


 


Anisocytosis  Not Reportable   09/29/22  03:45    


 


Microcytosis  Not Reportable   09/29/22  03:45    


 


Macrocytosis  Not Reportable   09/29/22  03:45    


 


Spherocytes  Not Reportable   09/29/22  03:45    


 


Pappenheimer Bodies  Not Reportable   09/29/22  03:45    


 


Sickle Cells  Not Reportable   09/29/22  03:45    


 


Target Cells  Not Reportable   09/29/22  03:45    


 


Tear Drop Cells  Not Reportable   09/29/22  03:45    


 


Ovalocytes  Not Reportable   09/29/22  03:45    


 


Helmet Cells  Not Reportable   09/29/22  03:45    


 


[place] Bodies  Not Reportable   09/29/22  03:45    


 


Cabot Rings  Not Reportable   09/29/22  03:45    


 


Lizy Cells  Not Reportable   09/29/22  03:45    


 


Bite Cells  Not Reportable   09/29/22  03:45    


 


Crenated Cell  Not Reportable   09/29/22  03:45    


 


Elliptocytes  Not Reportable   09/29/22  03:45    


 


Acanthocytes (Spur)  Not Reportable   09/29/22  03:45    


 


Rouleaux  Not Reportable   09/29/22  03:45    


 


Hemoglobin C Crystals  Not Reportable   09/29/22  03:45    


 


Schistocytes  Not Reportable   09/29/22  03:45    


 


Malaria parasites  Not Reportable   09/29/22  03:45    


 


Misha Bodies  Not Reportable   09/29/22  03:45    


 


Hem Pathologist Commnt  No   09/29/22  03:45    


 


ABG pH  6.964 pH Units (7.350-7.450)  L*  09/28/22  14:38    


 


ABG pCO2  12.2 mm Hg  09/28/22  14:38    


 


ABG pO2  139.1 mm Hg (80.0-90.0)  H  09/28/22  14:38    


 


ABG HCO3  2.7 mmol/L (20.0-26.0)  L  09/28/22  14:38    


 


ABG O2 Saturation  97.9 % (95.0-99.0)   09/28/22  14:38    


 


ABG O2 Content  17.3  (0.0-44)   09/28/22  14:38    


 


ABG Base Excess  -27.4 mmol/L (-2.0-3.0)  L  09/28/22  14:38    


 


ABG Hemoglobin  12.7 gm/dl (14.0-18.0)  L  09/28/22  14:38    


 


ABG Carboxyhemoglobin  1.7 % (0.0-5.0)   09/28/22  14:38    


 


ABG Methemoglobin  0.7 % (0.0-1.5)   09/28/22  14:38    


 


VBG pH  6.884  (7.320-7.420)  L*  09/28/22  10:21    


 


Oxyhemoglobin  95.5 % (95.0-99.0)   09/28/22  14:38    


 


FiO2  21 %  09/28/22  14:38    


 


Sodium  141 mmol/L (137-145)   09/30/22  04:20    


 


Potassium  3.3 mmol/L (3.6-5.0)  L  09/30/22  04:20    


 


Chloride  109.5 mmol/L ()  H  09/30/22  04:20    


 


Carbon Dioxide  20 mmol/L (22-30)  L  09/30/22  04:20    


 


Anion Gap  15 mmol/L  09/30/22  04:20    


 


BUN  7 mg/dL (9-20)  L  09/30/22  04:20    


 


Creatinine  1.5 mg/dL (0.8-1.3)  H  09/30/22  04:20    


 


Estimated GFR  > 60 ml/min  09/30/22  04:20    


 


BUN/Creatinine Ratio  5 %  09/30/22  04:20    


 


Glucose  131 mg/dL ()  H  09/30/22  04:20    


 


POC Glucose  139 mg/dL ()  H  09/30/22  07:13    


 


Lactic Acid  1.40 mmol/L (0.7-2.0)   09/28/22  17:52    


 


Calcium  8.6 mg/dL (8.4-10.2)   09/30/22  04:20    


 


Phosphorus  1.70 mg/dL (2.5-4.5)  L D 09/30/22  04:20    


 


Magnesium  1.60 mg/dL (1.7-2.3)  L  09/30/22  04:20    


 


Total Bilirubin  0.80 mg/dL (0.1-1.2)   09/29/22  03:45    


 


AST  21 units/L (5-40)   09/29/22  03:45    


 


ALT  22 units/L (7-56)   09/29/22  03:45    


 


Alkaline Phosphatase  97 units/L ()   09/29/22  03:45    


 


C-Reactive Protein  3.70 mg/dL (0.00-1.30)  H  09/29/22  11:53    


 


Total Protein  5.9 g/dL (6.3-8.2)  L  09/29/22  03:45    


 


Albumin  3.8 g/dL (3.9-5)  L  09/29/22  03:45    


 


Albumin/Globulin Ratio  1.8 %  09/29/22  03:45    


 


Procalcitonin  7.94 ng/mL (<0.15)   09/29/22  11:53    


 


Urine Color  Colorless  (Yellow)   09/28/22  Unknown


 


Urine Turbidity  Clear  (Clear)   09/28/22  Unknown


 


Urine pH  5.0  (5.0-7.0)   09/28/22  Unknown


 


Ur Specific Gravity  1.015  (1.003-1.030)   09/28/22  Unknown


 


Urine Protein  100 mg/dl mg/dL (Negative)   09/28/22  Unknown


 


Urine Glucose (UA)  >=500 mg/dL (Negative)   09/28/22  Unknown


 


Urine Ketones  80 mg/dL (Negative)   09/28/22  Unknown


 


Urine Blood  Sm  (Negative)   09/28/22  Unknown


 


Urine Nitrite  Neg  (Negative)   09/28/22  Unknown


 


Urine Bilirubin  Neg  (Negative)   09/28/22  Unknown


 


Urine Urobilinogen  < 2 mg/dL (<2.0)   09/28/22  Unknown


 


Ur Leukocyte Esterase  Neg  (Negative)   09/28/22  Unknown


 


Urine WBC (Auto)  < 1.0 /HPF (0.0-6.0)   09/28/22  Unknown


 


Urine RBC (Auto)  < 1.0 /HPF (0.0-6.0)   09/28/22  Unknown


 


Amorphous Crystals  Few   09/28/22  Unknown


 


HIV 1&2 Antibody Rapid  Non react  (Non React)   09/29/22  03:45    


 


HIV P24 Antigen  Non react  (Non React)   09/29/22  03:45    


 


Blood Type  AB POSITIVE   09/28/22  13:40    


 


Antibody Screen  Negative   09/28/22  13:40    











Microbiology: 


Microbiology





09/28/22 14:40   Peripheral/Venous   Blood Culture - Preliminary


                            NO GROWTH AFTER 24 HOURS


09/28/22 14:40   Peripheral/Venous   Blood Culture - Preliminary


                            NO GROWTH AFTER 24 HOURS








Cobos/IV: 


                                        





Voiding Method                   Urinal











Active Medications





- Current Medications


Current Medications: 














Generic Name Dose Route Start Last Admin





  Trade Name Freq  PRN Reason Stop Dose Admin


 


Acetaminophen  650 mg  09/28/22 11:49 





  Acetaminophen 325 Mg Tab  PO  





  Q6H PRN  





  Pain MILD(1-3)/Fever >100.5/HA  


 


Albuterol  2.5 mg  09/28/22 11:49 





  Albuterol 2.5 Mg/3 Ml Nebu  IH  





  Q3HRT PRN  





  Shortness Of Breath  


 


Famotidine  20 mg  09/29/22 10:00  09/29/22 14:08





  Famotidine 20 Mg/2 Ml Inj  IV   20 mg





  QDAY NATY   Administration


 


Heparin Sodium (Porcine)  5,000 unit  09/28/22 22:00  09/29/22 23:32





  Heparin 5,000 Unit/1 Ml Vial  SUB-Q   5,000 unit





  Q12HR NATY   Administration


 


Hydromorphone HCl  0.25 mg  09/28/22 11:49 





  Hydromorphone 0.5 Mg/0.5 Ml Inj  IV  





  Q4H PRN  





  Pain, Moderate (4-6)  


 


Hydromorphone HCl  0.5 mg  09/28/22 11:49 





  Hydromorphone 0.5 Mg/0.5 Ml Inj  IV  





  Q13H PRN  





  Pain , Severe (7-10)  


 


Levofloxacin/Dextrose  750 mg in 150 mls @ 100 mls/hr  09/28/22 12:00  09/29/22 

14:05





  Levaquin 750mg/150ml  IV   100 mls/hr





  Q24H NATY   Administration





  Protocol  


 


Insulin Human Regular 100  100 mls @ 1 mls/hr  09/28/22 13:00  09/30/22 06:22





  units/ Sodium Chloride  IV   2 units/hr





  TITR NATY   2 mls/hr





    Titration





  Protocol  





  1 UNITS/HR  


 


Potassium Chloride/Dextrose/Sod Cl  20 meq in 1,000 mls @ 125 mls/hr  09/28/22 

16:00  09/30/22 03:00





  D5w/0.45% Nacl/Kcl 20 Meq  IV   125 mls/hr





  AS DIRECT NATY   Administration


 


Sodium Bicarbonate 100 meq/  1,100 mls @ 100 mls/hr  09/29/22 08:33  09/30/22 

02:37





  Sodium Chloride  IV   100 mls/hr





  AS DIRECT NATY   Administration


 


Oxycodone/Acetaminophen  1 tab  09/28/22 11:49 





  Oxycodone /Acetaminophen 5-325mg Tab  PO  





  Q6H PRN  





  Pain, Moderate (4-6)  


 


Sodium Chloride  10 ml  09/28/22 22:00  09/29/22 23:32





  Sodium Chloride 0.9% 10 Ml Flush Syringe  IV   10 ml





  BID NATY   Administration


 


Sodium Chloride  10 ml  09/28/22 11:49 





  Sodium Chloride 0.9% 10 Ml Flush Syringe  IV  





  PRN PRN  





  LINE FLUSH  














Nutrition/Malnutrition Assess





- Dietary Evaluation


Nutrition/Malnutrition Findings: 


                                        





Nutrition Notes                                            Start:  09/29/22 

13:36


Freq:                                                      Status: Active       

 


Protocol:                                                                       

 


 Document     09/29/22 13:36  CM  (Rec: 09/29/22 13:46  CM  [id number])


 Co-Sign      09/29/22 13:36  WW


 Nutrition Notes


     Need for Assessment generated from:         RN Referral,MST


     Initial or Follow up                        Assessment


     Current Diagnosis                           Acute Kidney Injury,Diabetes,


                                                 Sepsis,Respiratory Failure


     Other Pertinent Diagnosis                   Lung nodule, metabolic


                                                 acidosis, DKA


     Current Diet                                NPO


     Labs/Tests                                  Cl 108.9


                                                 CO2 3


                                                 Cr 2.0


                                                 Glu 152


     Pertinent Medications                       KCl


                                                 Insulin Human Regular


     Height                                      5 ft 8 in


     Weight                                      68 kg


     Usual Body Weight                           65.9 kg


     Ideal Body Weight (kg)                      70.00


     BMI                                         22.8


     Intake Prior to Admission                   Good


     Weight change and time frame                Unsure wt loss PTA per


                                                 malnutrition screening tool


                                                 assessment.


     Weight Status                               Appropriate


     Subjective/Other Information                RD consult for malnutrition


                                                 screening tool score 2 (unsure


                                                 wt loss/no decreased appetite


                                                 ).


                                                 Pt reported no decreased


                                                 appetite or wt loss PTA with a


                                                 UBW of 65.9kg.


                                                 Pt weighed at bedside - 68.5kg


                                                 at this time.


                                                 Nutrition-focused physical


                                                 examination performed


                                                 indicating no muscle wasting/


                                                 subcutaneous fat loss.


                                                 Last BM yesterday and pt


                                                 denied N/V/Abdominal pain


                                                 while eating or diarrhea.


                                                  strength equal and strong


                                                 per physical assessment.


     Burn                                        Absent


     Trauma                                      Absent


     GI Symptoms                                 None


     Food Allergy                                No


     Skin Integrity/Comment                      WNL


     Current % PO                                Other


     Minimum of two criteria                     No


     Fluid Accumulation                          N/A


     Reduced  Strength                       N/A (non-severe)


     Protein-Calorie Malnutrition                N\A


     #1


      Nutrition Diagnosis                        Altered nutrition-related


                                                 laboratory values


      Etiology                                   Insulin non-compliance


      As Evidenced by Signs and Symptoms         Blood glucose >180mg/dL on


                                                 admission per EMR and insulin


                                                 non-compliance per progress


                                                 notes.


     Is patient on ventilator?                   No


     Is Patient Ambulatory and/or Out of Bed     Yes


     REE-([place] Jeor-ambulatory/OOB) [     2157.350


      [id number]]                               


     Calculation Used for Recommendations        [facility]


     Additional Notes                            Protein: 1.2-2.0g/kg ABW; 82-


                                                 136g PRO q day


                                                 Fluid: 1mL/kcal or per team


 Nutrition Intervention


     Change Diet Order:                          Recommend pt to advance to


                                                 consistent carbohydrate diet


                                                 when medically appropriate.


     Goal #1                                     Pt diet to advance within 24-


                                                 72hrs.


     Goal #2                                     Fasting blood glucose to


                                                 remain <180mg/dL throughout


                                                 LOS.


     Follow-Up By:                               10/04/22


     Additional Comments                         Monitor diet advancement and


                                                 nutrition-related lab values

## 2022-09-29 NOTE — CONSULTATION
History of Present Illness


Consult date: 09/29/22


Requesting physician: EUGENIA PLASCENCIA


Reason for consult: lung mass (lung nodeuls RUL Apical)


History of present illness: 





PULMONARY/CCM CONSULT NOTE (Full dictation # [id number])





Please see dictated notes for full details





Past History


Past Medical History: diabetes


Past Surgical History: No surgical history, Other (Reviewed)


Social history: single.  denies: smoking, alcohol abuse, prescription drug abuse

, IV drug use


Family history: diabetes, hypertension





Medications and Allergies


                                    Allergies











Allergy/AdvReac Type Severity Reaction Status Date / Time


 


No Known Allergies Allergy   Verified 09/28/22 12:01











                                Home Medications











 Medication  Instructions  Recorded  Confirmed  Last Taken  Type


 


Insulin Lispro [HumaLOG VIAL] 0 units SQ AC 02/13/17 09/28/22 02/13/17 History


 


Insulin Glargine [Lantus VIAL] 30 units SUB-Q QDAY 09/28/22 09/28/22 Unknown 

History











Active Meds: 


Active Medications





Acetaminophen (Acetaminophen 325 Mg Tab)  650 mg PO Q6H PRN


   PRN Reason: Pain MILD(1-3)/Fever >100.5/HA


Albuterol (Albuterol 2.5 Mg/3 Ml Nebu)  2.5 mg IH Q3HRT PRN


   PRN Reason: Shortness Of Breath


Famotidine (Famotidine 20 Mg/2 Ml Inj)  20 mg IV QDAY NATY


Heparin Sodium (Porcine) (Heparin 5,000 Unit/1 Ml Vial)  5,000 unit SUB-Q Q12HR 

NATY


   Last Admin: 09/29/22 10:21 Dose:  5,000 unit


   


Hydromorphone HCl (Hydromorphone 0.5 Mg/0.5 Ml Inj)  0.25 mg IV Q4H PRN


   PRN Reason: Pain, Moderate (4-6)


Hydromorphone HCl (Hydromorphone 0.5 Mg/0.5 Ml Inj)  0.5 mg IV Q13H PRN


   PRN Reason: Pain , Severe (7-10)


Levofloxacin/Dextrose (Levaquin 750mg/150ml)  750 mg in 150 mls @ 100 mls/hr IV 

Q24H NATY; Protocol


   Last Infusion: 09/28/22 16:59 Dose:  Infused


   


Insulin Human Regular 100 (units/ Sodium Chloride)  100 mls @ 1 mls/hr IV TITR 

NATY; Protocol


   Last Titration: 09/29/22 09:30 Dose:  0 units/hr, 0 mls/hr


   


Potassium Chloride/Dextrose/Sod Cl (D5w/0.45% Nacl/Kcl 20 Meq)  20 meq in 1,000 

mls @ 125 mls/hr IV AS DIRECT NATY


   Last Admin: 09/28/22 16:06 Dose:  125 mls/hr


   


Sodium Bicarbonate 100 meq/ (Sodium Chloride)  1,100 mls @ 100 mls/hr IV AS 

DIRECT NATY


Oxycodone/Acetaminophen (Oxycodone /Acetaminophen 5-325mg Tab)  1 tab PO Q6H PRN


   PRN Reason: Pain, Moderate (4-6)


Sodium Chloride (Sodium Chloride 0.9% 10 Ml Flush Syringe)  10 ml IV BID NATY


   Last Admin: 09/29/22 10:19 Dose:  10 ml


   


Sodium Chloride (Sodium Chloride 0.9% 10 Ml Flush Syringe)  10 ml IV PRN PRN


   PRN Reason: LINE FLUSH











Physical Examination


Vital signs: 


                                   Vital Signs











Temp Pulse Resp BP Pulse Ox


 


 98.7 F   90   18   179/124   100 


 


 09/28/22 09:22  09/28/22 09:22  09/28/22 09:22  09/28/22 09:22  09/28/22 09:22














Results





- Laboratory Findings


CBC and BMP: 


                                 09/29/22 03:45





                                 09/29/22 11:53


ABG











ABG pH  6.964 pH Units (7.350-7.450)  L*  09/28/22  14:38    


 


ABG pCO2  12.2 mm Hg  09/28/22  14:38    


 


ABG pO2  139.1 mm Hg (80.0-90.0)  H  09/28/22  14:38    


 


ABG O2 Saturation  97.9 % (95.0-99.0)   09/28/22  14:38    








Abnormal lab findings: 


                                  Abnormal Labs











  09/28/22 09/28/22 09/28/22





  10:21 10:21 10:21


 


WBC  25.7 H  


 


RBC  5.06 H  


 


Hct  46.4 H  


 


MCH  27 L  


 


MCHC  29 L  


 


Seg Neuts % (Manual)  85.0 H  


 


Lymphocytes % (Manual)  9.0 L  


 


Seg Neutrophils # Man  21.8 H  


 


Lymphocytes # (Manual)   


 


ABG pH   


 


ABG pO2   


 


ABG HCO3   


 


ABG Base Excess   


 


ABG Hemoglobin   


 


VBG pH    6.884 L*


 


Sodium   131 L 


 


Potassium   6.9 H* 


 


Chloride   88.7 L 


 


Carbon Dioxide   4 L* 


 


BUN   29 H 


 


Creatinine   2.2 H 


 


Glucose   612 H* 


 


POC Glucose   


 


Calcium   


 


Phosphorus   


 


Alkaline Phosphatase   134 H 


 


Total Protein   


 


Albumin   














  09/28/22 09/28/22 09/28/22





  13:16 14:02 14:04


 


WBC   


 


RBC   


 


Hct   


 


MCH   


 


MCHC   


 


Seg Neuts % (Manual)   


 


Lymphocytes % (Manual)   


 


Seg Neutrophils # Man   


 


Lymphocytes # (Manual)   


 


ABG pH   


 


ABG pO2   


 


ABG HCO3   


 


ABG Base Excess   


 


ABG Hemoglobin   


 


VBG pH   


 


Sodium   


 


Potassium    5.7 H


 


Chloride    97.5 L


 


Carbon Dioxide    3 L*


 


BUN    26 H


 


Creatinine    1.8 H


 


Glucose    533 H*


 


POC Glucose  512 H  405 H 


 


Calcium    7.9 L D


 


Phosphorus   


 


Alkaline Phosphatase   


 


Total Protein   


 


Albumin   














  09/28/22 09/28/22 09/28/22





  14:04 14:38 15:16


 


WBC   


 


RBC   


 


Hct   


 


MCH   


 


MCHC   


 


Seg Neuts % (Manual)   


 


Lymphocytes % (Manual)   


 


Seg Neutrophils # Man   


 


Lymphocytes # (Manual)   


 


ABG pH   6.964 L* 


 


ABG pO2   139.1 H 


 


ABG HCO3   2.7 L 


 


ABG Base Excess   -27.4 L 


 


ABG Hemoglobin   12.7 L 


 


VBG pH   


 


Sodium   


 


Potassium   


 


Chloride   


 


Carbon Dioxide   


 


BUN   


 


Creatinine   


 


Glucose   


 


POC Glucose    285 H


 


Calcium   


 


Phosphorus  7.00 H  


 


Alkaline Phosphatase   


 


Total Protein   


 


Albumin   














  09/28/22 09/28/22 09/28/22





  16:05 17:28 17:52


 


WBC   


 


RBC   


 


Hct   


 


MCH   


 


MCHC   


 


Seg Neuts % (Manual)   


 


Lymphocytes % (Manual)   


 


Seg Neutrophils # Man   


 


Lymphocytes # (Manual)   


 


ABG pH   


 


ABG pO2   


 


ABG HCO3   


 


ABG Base Excess   


 


ABG Hemoglobin   


 


VBG pH   


 


Sodium   


 


Potassium   


 


Chloride    110.1 H


 


Carbon Dioxide    4 L*


 


BUN    21 H


 


Creatinine    1.6 H


 


Glucose    169 H


 


POC Glucose  236 H  152 H 


 


Calcium    7.0 L


 


Phosphorus   


 


Alkaline Phosphatase   


 


Total Protein   


 


Albumin   














  09/28/22 09/28/22 09/28/22





  18:44 19:45 20:04


 


WBC   


 


RBC   


 


Hct   


 


MCH   


 


MCHC   


 


Seg Neuts % (Manual)   


 


Lymphocytes % (Manual)   


 


Seg Neutrophils # Man   


 


Lymphocytes # (Manual)   


 


ABG pH   


 


ABG pO2   


 


ABG HCO3   


 


ABG Base Excess   


 


ABG Hemoglobin   


 


VBG pH   


 


Sodium   


 


Potassium    5.1 H


 


Chloride    109.0 H


 


Carbon Dioxide    5 L*


 


BUN   


 


Creatinine    1.7 H


 


Glucose    167 H


 


POC Glucose  192 H  177 H 


 


Calcium    7.4 L


 


Phosphorus   


 


Alkaline Phosphatase   


 


Total Protein   


 


Albumin   














  09/28/22 09/28/22 09/28/22





  20:50 21:41 23:40


 


WBC   


 


RBC   


 


Hct   


 


MCH   


 


MCHC   


 


Seg Neuts % (Manual)   


 


Lymphocytes % (Manual)   


 


Seg Neutrophils # Man   


 


Lymphocytes # (Manual)   


 


ABG pH   


 


ABG pO2   


 


ABG HCO3   


 


ABG Base Excess   


 


ABG Hemoglobin   


 


VBG pH   


 


Sodium   


 


Potassium   


 


Chloride   


 


Carbon Dioxide   


 


BUN   


 


Creatinine   


 


Glucose   


 


POC Glucose  149 H  126 H  115 H


 


Calcium   


 


Phosphorus   


 


Alkaline Phosphatase   


 


Total Protein   


 


Albumin   














  09/29/22 09/29/22 09/29/22





  02:44 03:45 03:45


 


WBC   22.2 H 


 


RBC   


 


Hct   


 


MCH   26 L 


 


MCHC   30 L 


 


Seg Neuts % (Manual)   95.0 H 


 


Lymphocytes % (Manual)   4.0 L 


 


Seg Neutrophils # Man   21.1 H 


 


Lymphocytes # (Manual)   0.9 L 


 


ABG pH   


 


ABG pO2   


 


ABG HCO3   


 


ABG Base Excess   


 


ABG Hemoglobin   


 


VBG pH   


 


Sodium   


 


Potassium   


 


Chloride    108.9 H


 


Carbon Dioxide    3 L*


 


BUN   


 


Creatinine    2.0 H


 


Glucose    152 H


 


POC Glucose  142 H  


 


Calcium    8.1 L


 


Phosphorus   


 


Alkaline Phosphatase   


 


Total Protein    5.9 L


 


Albumin    3.8 L














  09/29/22 09/29/22





  03:48 05:17


 


WBC  


 


RBC  


 


Hct  


 


MCH  


 


MCHC  


 


Seg Neuts % (Manual)  


 


Lymphocytes % (Manual)  


 


Seg Neutrophils # Man  


 


Lymphocytes # (Manual)  


 


ABG pH  


 


ABG pO2  


 


ABG HCO3  


 


ABG Base Excess  


 


ABG Hemoglobin  


 


VBG pH  


 


Sodium  


 


Potassium  


 


Chloride  


 


Carbon Dioxide  


 


BUN  


 


Creatinine  


 


Glucose  


 


POC Glucose  167 H  128 H


 


Calcium  


 


Phosphorus  


 


Alkaline Phosphatase  


 


Total Protein  


 


Albumin

## 2022-09-30 VITALS — SYSTOLIC BLOOD PRESSURE: 134 MMHG | DIASTOLIC BLOOD PRESSURE: 94 MMHG

## 2022-09-30 LAB
BUN SERPL-MCNC: 5 MG/DL (ref 9–20)
BUN SERPL-MCNC: 7 MG/DL (ref 9–20)
BUN SERPL-MCNC: 7 MG/DL (ref 9–20)
BUN/CREAT SERPL: 5 %
CALCIUM SERPL-MCNC: 8.3 MG/DL (ref 8.4–10.2)
CALCIUM SERPL-MCNC: 8.4 MG/DL (ref 8.4–10.2)
CALCIUM SERPL-MCNC: 8.6 MG/DL (ref 8.4–10.2)
HCT VFR BLD CALC: 34.1 % (ref 35.5–45.6)
HEMOLYSIS INDEX: 15
HEMOLYSIS INDEX: 42
HEMOLYSIS INDEX: 6
HGB BLD-MCNC: 11.2 GM/DL (ref 11.8–15.2)
MCHC RBC AUTO-ENTMCNC: 33 % (ref 32–34)
MCV RBC AUTO: 83 FL (ref 84–94)
PLATELET # BLD: 127 K/MM3 (ref 140–440)
RBC # BLD AUTO: 4.12 M/MM3 (ref 3.65–5.03)

## 2022-09-30 RX ADMIN — INSULIN HUMAN SCH: 100 INJECTION, SOLUTION PARENTERAL at 18:03

## 2022-09-30 RX ADMIN — Medication SCH ML: at 09:56

## 2022-09-30 RX ADMIN — INSULIN HUMAN SCH UNITS: 100 INJECTION, SOLUTION PARENTERAL at 12:37

## 2022-09-30 RX ADMIN — SODIUM BICARBONATE SCH MLS/HR: 84 INJECTION, SOLUTION INTRAVENOUS at 02:37

## 2022-09-30 RX ADMIN — DEXTROSE, SODIUM CHLORIDE, AND POTASSIUM CHLORIDE SCH MLS/HR: 5; .45; .15 INJECTION INTRAVENOUS at 03:00

## 2022-09-30 RX ADMIN — FAMOTIDINE SCH MG: 10 INJECTION, SOLUTION INTRAVENOUS at 09:56

## 2022-09-30 RX ADMIN — HEPARIN SODIUM SCH UNIT: 5000 INJECTION, SOLUTION INTRAVENOUS; SUBCUTANEOUS at 09:56

## 2022-09-30 NOTE — DISCHARGE SUMMARY
<STEPHANIE KAMARA - Last Filed: 09/30/22 20:18>





Providers





- Providers


Date of Admission: 


09/28/22 11:49





Date of discharge: 09/30/22


Attending physician: 


VINCE CARRERA MD





                                        





09/28/22 17:54


Consult to Physician [CONS] Routine 


   Comment: 


   Consulting Provider: BHUPENDRA MESSER


   Physician Instructions: 


   Reason For Exam: Right lung nodule











Primary care physician: 


DOTTY TRAN








Hospitalization


Reason for admission: DKA (Diabetic Ketoacidosis)


Condition: Stable


Hospital course: 


This is a 22-year-old male with known past medical history of type 1 Diabetes 

[place] admitted for DKA and sepsis





Hospital Course to Date:


9/29: remains in severe metabolic acidosis this am, on DKA protocol and now on 

bcarb gtt. Additional IVF bolus administered, continue insulin gtt and NaBcarb 

gtt. Will transition to subq insulin once acidosis resolved and anion gap is 

closed. Continue current IV abx for pneumonitis, patient remains afebrile and 

stable on RA. Blood cultures pending, check CRP and procal. [facility] is also 

following.





9/30: Patient transitioned to subQ insulin, tolerating PO intake, denied any 

N/V, no abdominal pain. Patient remains stable on RA, no respiratory distress 

noted, VSS. Patient is stable for discharge home. Plan of care discussed with 

patient at the bedside, patient agreed with current care plan. Patient request 

prescription for 70/30 for discharge. Patient report that he was added to his 

mother insurance and it will start next month. Patient advice to find and follow

 with primary care provider within 7 to 14 days to help him manage his DM. All 

question and concerns were addressed at this time. Patient verbalized 

understanding and agreed with the info provided.











Assessment and Plan


#DKA (diabetic ketoacidosis)


#Anion Gap Metabolic Acidosis


#Insulin-dependent diabetes mellitus(IDDM)


- A1c 10.4 from 6/2022


- s/p DKA protocol


- Tolerating PO intake, transitioned to subQ insulin


- Electrolytes repleted


- Monitor and replace electrolytes as needed 


- Monitor anion gap, serial Labs ordered





#Acute Kidney Injury([place]) Vasomotor Nephropathy-resolved


#Hypokalemia


#Hyperkalemia-resolved


- most likely related to DKA/Dehydration


- s/p DKA protocol


- K, mg, and phos repleted


- Strict intake and output


- Avoid nephrotoxic medications; Renally dose medications 


- Monitor and replace electrolytes as needed 


- Serial Labs ordered





#Nausea/Vomiting-resolved


- most likely 2/2 to above


- Denied any N/V, no abdominal pain


- PRN antiemetic for N/V





#Acute Hypoxic Respiratory Failure-resolved


#Pneumonitis


#Possible Aspiration


- Presented with SPO2 in 89% on RA


- CT chest suggest possible atypical infectious or inflammatory 

process/pneumonitis


- Stable on RA this am, probably aspiration due to N/V


- Received 3days of IV Antibiotic- Levofloxacin and X1 dose of IV Vanco


- PRN O2 supplementation


- Continue O2 monitoring for SPO2 goal above 95%


- Aspiration precaution





#Sepsis


#Leukocytosis


#Pneumonitis


#Possible Aspiration


- CT chest suggest possible atypical infectious or inflammatory 

process/pneumonitis


- Patient remains afebrile, stable on RA, VSS.


- Blood cultures with NGTD


- Received 3days of IV Antibiotic- Levofloxacin and X1 dose of IV Vanco


- F/U on cultures


- Daily CBC monitor





#GI/DVT Prophylaxis


- PPI- Pepcid


- Heparin subQ


- SCD to bilateral lower extremities while in bed





#Advance Care Planning 


- Disease education data, care plan, diagnoses, and prognosis were discussed 

with the patient at the bedside. Patient is a FULL code.  Patient acknowledged 

understanding and agreed with current care plan.


Disposition: 01 HOME / SELF CARE / HOMELESS


Final Discharge Diagnosis (Prints w/discharge instructions): Type 1 Diabetes 

[facility]


Time spent for discharge: 35





Core Measure Documentation





- Palliative Care


Palliative Care/ Comfort Measures: Not Applicable





- Core Measures


Any of the following diagnoses?: none





Exam





- Constitutional


Vitals: 


                                        











Temp Pulse Resp BP Pulse Ox


 


 98.6 F   93 H  21   138/84   100 


 


 09/30/22 16:00  09/30/22 16:30  09/30/22 16:30  09/30/22 16:30  09/30/22 16:30











General appearance: Present: no acute distress, well-nourished





- EENT


Eyes: Present: PERRL, EOM intact


ENT: hearing intact, clear oral mucosa





- Neck


Neck: Present: normal ROM





- Respiratory


Respiratory effort: normal


Respiratory: bilateral: CTA





- Cardiovascular


Rhythm: regular


Heart Sounds: Present: S1 & S2





- Extremities


Extremities: no ischemia, pulses intact, pulses symmetrical


Peripheral Pulses: within normal limits





- Abdominal


General gastrointestinal: Present: soft, non-distended, normal bowel sounds


Male genitourinary: Present: deferred





- Rectal


Rectal Exam: deferred





- Integumentary


Integumentary: Present: clear, warm, dry





- Musculoskeletal


Musculoskeletal: strength equal bilaterally





- Psychiatric


Psychiatric: appropriate mood/affect, cooperative





- Neurologic


Neurologic: CNII-XII intact, moves all extremities





- Allied Health


Allied health notes reviewed: nursing, case management





Plan


Activity: no restrictions


Diet: diabetic, low carbohydrate


Wound: open to air


Care Plan Goals: 


Take your medications as prescribe





Follow up with your primary care provider within 2 weeks





If your symptoms return, please visit your nearest emergency department for 

further evaluation


Follow up with: 


DOTTY TRAN MD [Primary Care Provider] - 7 Days


Prescriptions: 


Lispro Insulin [HumaLOG] See Protocol SQ ACHS 60 Days #20 ml


Insulin Aspart Prot/Aspart(Nf) [NovoLOG Mix 70/30 VIAL] 22 units SQ BID 90 Days 

#4000 units


Other Discharge Orders: 


Glucometer  (Amb) Location: None Selected


Glucometer supplies[Amb] Location: None Selected





<VINCE CARRERA - Last Filed: 10/02/22 07:18>





Providers





- Providers


Date of Admission: 


09/28/22 11:49





Attending physician: 


VINCE CARRERA MD





                                        





09/28/22 17:54


Consult to Physician [CONS] Routine 


   Comment: 


   Consulting Provider: BHUPENDRA MESSER


   Physician Instructions: 


   Reason For Exam: Right lung nodule











Primary care physician: 


DOTTY TRAN








Hospitalization


Hospital course: 


I saw and evaluated the patient. I agree with the findings and the plan of care 

as documented in the Nurse Practitioner's~note, with the following corrections 

and additions.











Exam





- Constitutional


Vitals: 


                                        











Temp Pulse Resp BP Pulse Ox


 


 98.6 F   110 H  14   134/94   100 


 


 09/30/22 16:00  09/30/22 18:00  09/30/22 18:00  09/30/22 18:00  09/30/22 18:00

## 2022-09-30 NOTE — PROGRESS NOTE
Assessment and Plan








NICOLÁS


DKA


Abnormal CT chest in non smoker


Metabolic Acidosis





- continue to wean supplemental oxygen to keep O2 sats > 90%


- continue Bronchodilators (PHILIP & LABA) with pulm hygiene per RT


- continue systemic steroids with slow taper


- continue inhaled corticosteroids


- continue to avoid nephrotoxins, renally dose all medications


- continue mobility protocols to prevent pressure ulcers


- PT/OT as tolerated


- Wound care per RN/WCT


- continue accuchecks with glycemic control per SSI for target blood glucose < 

180 mg/dL 


- Smoking cessation strongly counseled at the bedside  


- home oxygen evaluation at discharge


- GI & VTE prophylaxis


- Flu & pneumovax per protocol


- Pulmonary out patient follow up for PFTs and optimization of respiratory 

status


- continue other care per attending / other consultants


- prn analgesia per pain score





... re-evaluate in am & prn





Subjective


Date of service: 09/30/22


Principal diagnosis: NICOLÁS; DKA; Abnormal CT chest in non smoker; Metabolic 

Acidosis


Interval history: 





Patient is seen today for: NICOLÁS; DKA; Abnormal CT chest in non smoker; Metabolic 

Acidosis





Seen and examined at bedside; 24hour events reviewed; nursing and respiratory 

care staff consulted; no adverse overnight events reported to me; resting peace

fully in bed; 





Objective


                               Vital Signs - 12hr











  09/30/22 09/30/22 09/30/22





  01:21 01:31 01:41


 


Temperature   


 


Pulse Rate 96 H 94 H 99 H


 


Pulse Rate [   





From Monitor]   


 


Respiratory 25 H 21 21





Rate   


 


Blood Pressure 141/86 141/86 141/86


 


O2 Sat by Pulse 99 99 98





Oximetry   














  09/30/22 09/30/22 09/30/22





  01:51 02:00 02:11


 


Temperature   


 


Pulse Rate 90 98 H 91 H


 


Pulse Rate [   





From Monitor]   


 


Respiratory 21 20 19





Rate   


 


Blood Pressure 141/86 128/80 128/80


 


O2 Sat by Pulse 98 98 99





Oximetry   














  09/30/22 09/30/22 09/30/22





  02:21 02:31 02:41


 


Temperature   


 


Pulse Rate 91 H 95 H 107 H


 


Pulse Rate [   





From Monitor]   


 


Respiratory 21 21 21





Rate   


 


Blood Pressure 128/80 128/80 128/80


 


O2 Sat by Pulse 99 99 99





Oximetry   














  09/30/22 09/30/22 09/30/22





  02:51 03:00 03:11


 


Temperature   


 


Pulse Rate 98 H 105 H 93 H


 


Pulse Rate [   





From Monitor]   


 


Respiratory 20 20 20





Rate   


 


Blood Pressure 128/80 133/82 133/82


 


O2 Sat by Pulse 100 98 100





Oximetry   














  09/30/22 09/30/22 09/30/22





  03:21 03:23 03:31


 


Temperature  99.9 F H 


 


Pulse Rate 97 H  100 H


 


Pulse Rate [   





From Monitor]   


 


Respiratory 20  21





Rate   


 


Blood Pressure 133/82  133/82


 


O2 Sat by Pulse 98  98





Oximetry   














  09/30/22 09/30/22 09/30/22





  03:41 03:51 04:00


 


Temperature   


 


Pulse Rate 100 H 99 H 109 H


 


Pulse Rate [   94 H





From Monitor]   


 


Respiratory 22 21 20





Rate   


 


Blood Pressure 133/82 133/82 156/99


 


O2 Sat by Pulse 98 98 100





Oximetry   














  09/30/22 09/30/22 09/30/22





  04:11 04:21 04:31


 


Temperature   


 


Pulse Rate 87 89 88


 


Pulse Rate [   





From Monitor]   


 


Respiratory 20 18 21





Rate   


 


Blood Pressure 156/99 156/99 156/99


 


O2 Sat by Pulse 100 100 100





Oximetry   














  09/30/22 09/30/22 09/30/22





  04:41 04:51 05:01


 


Temperature   


 


Pulse Rate 94 H 104 H 93 H


 


Pulse Rate [   





From Monitor]   


 


Respiratory 21 20 20





Rate   


 


Blood Pressure 156/99 156/99 156/99


 


O2 Sat by Pulse 100 100 100





Oximetry   














  09/30/22 09/30/22 09/30/22





  05:11 05:21 05:31


 


Temperature   


 


Pulse Rate 90 91 H 96 H


 


Pulse Rate [   





From Monitor]   


 


Respiratory 23 22 23





Rate   


 


Blood Pressure 156/99 156/99 156/99


 


O2 Sat by Pulse 100 99 100





Oximetry   














  09/30/22 09/30/22 09/30/22





  05:41 05:51 06:00


 


Temperature   


 


Pulse Rate 92 H 97 H 94 H


 


Pulse Rate [   





From Monitor]   


 


Respiratory 21 21 20





Rate   


 


Blood Pressure 156/99 113/69 123/75


 


O2 Sat by Pulse 100 99 98





Oximetry   














  09/30/22 09/30/22 09/30/22





  06:11 06:21 06:31


 


Temperature   


 


Pulse Rate 92 H 92 H 94 H


 


Pulse Rate [   





From Monitor]   


 


Respiratory 19 23 21





Rate   


 


Blood Pressure 123/75 123/75 123/75


 


O2 Sat by Pulse 98 98 98





Oximetry   














  09/30/22 09/30/22 09/30/22





  06:41 06:51 07:00


 


Temperature   


 


Pulse Rate 93 H 88 88


 


Pulse Rate [   





From Monitor]   


 


Respiratory 18 21 20





Rate   


 


Blood Pressure 123/75 123/75 135/84


 


O2 Sat by Pulse 98 99 99





Oximetry   














  09/30/22 09/30/22 09/30/22





  07:11 07:21 07:30


 


Temperature   


 


Pulse Rate 91 H 90 93 H


 


Pulse Rate [   





From Monitor]   


 


Respiratory 17 22 19





Rate   


 


Blood Pressure 135/84 135/84 135/84


 


O2 Sat by Pulse 100 100 100





Oximetry   














  09/30/22 09/30/22 09/30/22





  07:43 07:50 08:00


 


Temperature   99 F


 


Pulse Rate 93 H 90 95 H


 


Pulse Rate [   





From Monitor]   


 


Respiratory 22 17 24





Rate   


 


Blood Pressure   128/84


 


O2 Sat by Pulse 99 100 100





Oximetry   














  09/30/22 09/30/22 09/30/22





  08:10 08:20 08:30


 


Temperature   


 


Pulse Rate 97 H 93 H 102 H


 


Pulse Rate [   





From Monitor]   


 


Respiratory 18 24 20





Rate   


 


Blood Pressure 128/84 128/84 128/84


 


O2 Sat by Pulse 100 100 99





Oximetry   














  09/30/22 09/30/22 09/30/22





  08:40 08:50 09:00


 


Temperature   


 


Pulse Rate 100 H 90 90


 


Pulse Rate [   





From Monitor]   


 


Respiratory 20 17 18





Rate   


 


Blood Pressure 128/84 128/84 122/82


 


O2 Sat by Pulse 100 100 98





Oximetry   














  09/30/22 09/30/22 09/30/22





  09:10 09:20 09:30


 


Temperature   


 


Pulse Rate 102 H 99 H 95 H


 


Pulse Rate [   





From Monitor]   


 


Respiratory 23 24 19





Rate   


 


Blood Pressure 122/82 122/82 122/82


 


O2 Sat by Pulse 98 98 98





Oximetry   














  09/30/22 09/30/22 09/30/22





  09:40 09:50 10:00


 


Temperature   


 


Pulse Rate 97 H 93 H 99 H


 


Pulse Rate [   





From Monitor]   


 


Respiratory 18 18 16





Rate   


 


Blood Pressure 122/82 122/82 143/95


 


O2 Sat by Pulse 98 99 100





Oximetry   














  09/30/22 09/30/22 09/30/22





  10:10 10:20 10:30


 


Temperature   


 


Pulse Rate 95 H 98 H 98 H


 


Pulse Rate [   





From Monitor]   


 


Respiratory 22 21 22





Rate   


 


Blood Pressure 143/95 143/95 143/95


 


O2 Sat by Pulse 100 100 100





Oximetry   














  09/30/22 09/30/22 09/30/22





  10:40 10:50 11:00


 


Temperature   


 


Pulse Rate 98 H 94 H 101 H


 


Pulse Rate [   





From Monitor]   


 


Respiratory 24 20 22





Rate   


 


Blood Pressure 143/95 143/95 126/90


 


O2 Sat by Pulse 98 99 100





Oximetry   














  09/30/22 09/30/22 09/30/22





  11:10 11:20 11:30


 


Temperature   


 


Pulse Rate 117 H 98 H 102 H


 


Pulse Rate [   





From Monitor]   


 


Respiratory 22 25 H 23





Rate   


 


Blood Pressure 126/90 126/90 126/90


 


O2 Sat by Pulse 100 100 100





Oximetry   














  09/30/22 09/30/22 09/30/22





  11:40 11:50 12:00


 


Temperature   99.6 F


 


Pulse Rate 109 H 95 H 105 H


 


Pulse Rate [   





From Monitor]   


 


Respiratory 20 26 H 12





Rate   


 


Blood Pressure 126/90 126/90 126/90


 


O2 Sat by Pulse 100 100 100





Oximetry   














  09/30/22 09/30/22





  12:10 12:20


 


Temperature  


 


Pulse Rate 110 H 110 H


 


Pulse Rate [  





From Monitor]  


 


Respiratory 21 17





Rate  


 


Blood Pressure 126/90 126/90


 


O2 Sat by Pulse 100 100





Oximetry  











Constitutional: no acute distress


Eyes: non-icteric


ENT: oropharynx moist


Neck: supple, no lymphadenopathy, no JVD


Effort: normal


Ascultation: Bilateral: clear


Percussion: Bilateral: not dull


Cardiovascular: regular rate and rhythm


Gastrointestinal: normoactive bowel sounds, soft, non-tender, non-distended


Integumentary: normal


Extremities: no cyanosis, no edema, pulses normal, no ischemia or petechiae


Neurologic: non-focal exam, pupils equal and round, CN II-XII normal, motor 

strength normal and


Psychiatric: mood appropriate, affect normal


CBC and BMP: 


                                 09/30/22 04:20





                                 09/30/22 04:20


ABG, PT/INR, D-dimer: 


ABG











ABG pH  6.964 pH Units (7.350-7.450)  L*  09/28/22  14:38    


 


ABG pCO2  12.2 mm Hg  09/28/22  14:38    


 


ABG pO2  139.1 mm Hg (80.0-90.0)  H  09/28/22  14:38    


 


ABG O2 Saturation  97.9 % (95.0-99.0)   09/28/22  14:38    








Abnormal lab findings: 


                                  Abnormal Labs











  09/28/22 09/28/22 09/28/22





  10:21 10:21 10:21


 


WBC  25.7 H  


 


RBC  5.06 H  


 


Hgb   


 


Hct  46.4 H  


 


MCV   


 


MCH  27 L  


 


MCHC  29 L  


 


Plt Count   


 


Seg Neuts % (Manual)  85.0 H  


 


Lymphocytes % (Manual)  9.0 L  


 


Seg Neutrophils # Man  21.8 H  


 


Lymphocytes # (Manual)   


 


ABG pH   


 


ABG pO2   


 


ABG HCO3   


 


ABG Base Excess   


 


ABG Hemoglobin   


 


VBG pH    6.884 L*


 


Sodium   131 L 


 


Potassium   6.9 H* 


 


Chloride   88.7 L 


 


Carbon Dioxide   4 L* 


 


BUN   29 H 


 


Creatinine   2.2 H 


 


Glucose   612 H* 


 


POC Glucose   


 


Calcium   


 


Phosphorus   


 


Magnesium   


 


Alkaline Phosphatase   134 H 


 


C-Reactive Protein   


 


Total Protein   


 


Albumin   














  09/28/22 09/28/22 09/28/22





  12:42 13:16 14:02


 


WBC   


 


RBC   


 


Hgb   


 


Hct   


 


MCV   


 


MCH   


 


MCHC   


 


Plt Count   


 


Seg Neuts % (Manual)   


 


Lymphocytes % (Manual)   


 


Seg Neutrophils # Man   


 


Lymphocytes # (Manual)   


 


ABG pH   


 


ABG pO2   


 


ABG HCO3   


 


ABG Base Excess   


 


ABG Hemoglobin   


 


VBG pH   


 


Sodium   


 


Potassium   


 


Chloride   


 


Carbon Dioxide   


 


BUN   


 


Creatinine   


 


Glucose   


 


POC Glucose  504 H  512 H  405 H


 


Calcium   


 


Phosphorus   


 


Magnesium   


 


Alkaline Phosphatase   


 


C-Reactive Protein   


 


Total Protein   


 


Albumin   














  09/28/22 09/28/22 09/28/22





  14:04 14:04 14:38


 


WBC   


 


RBC   


 


Hgb   


 


Hct   


 


MCV   


 


MCH   


 


MCHC   


 


Plt Count   


 


Seg Neuts % (Manual)   


 


Lymphocytes % (Manual)   


 


Seg Neutrophils # Man   


 


Lymphocytes # (Manual)   


 


ABG pH    6.964 L*


 


ABG pO2    139.1 H


 


ABG HCO3    2.7 L


 


ABG Base Excess    -27.4 L


 


ABG Hemoglobin    12.7 L


 


VBG pH   


 


Sodium   


 


Potassium  5.7 H  


 


Chloride  97.5 L  


 


Carbon Dioxide  3 L*  


 


BUN  26 H  


 


Creatinine  1.8 H  


 


Glucose  533 H*  


 


POC Glucose   


 


Calcium  7.9 L D  


 


Phosphorus   7.00 H 


 


Magnesium   


 


Alkaline Phosphatase   


 


C-Reactive Protein   


 


Total Protein   


 


Albumin   














  09/28/22 09/28/22 09/28/22





  15:16 16:05 17:28


 


WBC   


 


RBC   


 


Hgb   


 


Hct   


 


MCV   


 


MCH   


 


MCHC   


 


Plt Count   


 


Seg Neuts % (Manual)   


 


Lymphocytes % (Manual)   


 


Seg Neutrophils # Man   


 


Lymphocytes # (Manual)   


 


ABG pH   


 


ABG pO2   


 


ABG HCO3   


 


ABG Base Excess   


 


ABG Hemoglobin   


 


VBG pH   


 


Sodium   


 


Potassium   


 


Chloride   


 


Carbon Dioxide   


 


BUN   


 


Creatinine   


 


Glucose   


 


POC Glucose  285 H  236 H  152 H


 


Calcium   


 


Phosphorus   


 


Magnesium   


 


Alkaline Phosphatase   


 


C-Reactive Protein   


 


Total Protein   


 


Albumin   














  09/28/22 09/28/22 09/28/22





  17:52 18:44 19:45


 


WBC   


 


RBC   


 


Hgb   


 


Hct   


 


MCV   


 


MCH   


 


MCHC   


 


Plt Count   


 


Seg Neuts % (Manual)   


 


Lymphocytes % (Manual)   


 


Seg Neutrophils # Man   


 


Lymphocytes # (Manual)   


 


ABG pH   


 


ABG pO2   


 


ABG HCO3   


 


ABG Base Excess   


 


ABG Hemoglobin   


 


VBG pH   


 


Sodium   


 


Potassium   


 


Chloride  110.1 H  


 


Carbon Dioxide  4 L*  


 


BUN  21 H  


 


Creatinine  1.6 H  


 


Glucose  169 H  


 


POC Glucose   192 H  177 H


 


Calcium  7.0 L  


 


Phosphorus   


 


Magnesium   


 


Alkaline Phosphatase   


 


C-Reactive Protein   


 


Total Protein   


 


Albumin   














  09/28/22 09/28/22 09/28/22





  20:04 20:50 21:41


 


WBC   


 


RBC   


 


Hgb   


 


Hct   


 


MCV   


 


MCH   


 


MCHC   


 


Plt Count   


 


Seg Neuts % (Manual)   


 


Lymphocytes % (Manual)   


 


Seg Neutrophils # Man   


 


Lymphocytes # (Manual)   


 


ABG pH   


 


ABG pO2   


 


ABG HCO3   


 


ABG Base Excess   


 


ABG Hemoglobin   


 


VBG pH   


 


Sodium   


 


Potassium  5.1 H  


 


Chloride  109.0 H  


 


Carbon Dioxide  5 L*  


 


BUN   


 


Creatinine  1.7 H  


 


Glucose  167 H  


 


POC Glucose   149 H  126 H


 


Calcium  7.4 L  


 


Phosphorus   


 


Magnesium   


 


Alkaline Phosphatase   


 


C-Reactive Protein   


 


Total Protein   


 


Albumin   














  09/28/22 09/29/22 09/29/22





  23:40 02:44 03:45


 


WBC    22.2 H


 


RBC   


 


Hgb   


 


Hct   


 


MCV   


 


MCH    26 L


 


MCHC    30 L


 


Plt Count   


 


Seg Neuts % (Manual)    95.0 H


 


Lymphocytes % (Manual)    4.0 L


 


Seg Neutrophils # Man    21.1 H


 


Lymphocytes # (Manual)    0.9 L


 


ABG pH   


 


ABG pO2   


 


ABG HCO3   


 


ABG Base Excess   


 


ABG Hemoglobin   


 


VBG pH   


 


Sodium   


 


Potassium   


 


Chloride   


 


Carbon Dioxide   


 


BUN   


 


Creatinine   


 


Glucose   


 


POC Glucose  115 H  142 H 


 


Calcium   


 


Phosphorus   


 


Magnesium   


 


Alkaline Phosphatase   


 


C-Reactive Protein   


 


Total Protein   


 


Albumin   














  09/29/22 09/29/22 09/29/22





  03:45 03:48 05:17


 


WBC   


 


RBC   


 


Hgb   


 


Hct   


 


MCV   


 


MCH   


 


MCHC   


 


Plt Count   


 


Seg Neuts % (Manual)   


 


Lymphocytes % (Manual)   


 


Seg Neutrophils # Man   


 


Lymphocytes # (Manual)   


 


ABG pH   


 


ABG pO2   


 


ABG HCO3   


 


ABG Base Excess   


 


ABG Hemoglobin   


 


VBG pH   


 


Sodium   


 


Potassium   


 


Chloride  108.9 H  


 


Carbon Dioxide  3 L*  


 


BUN   


 


Creatinine  2.0 H  


 


Glucose  152 H  


 


POC Glucose   167 H  128 H


 


Calcium  8.1 L  


 


Phosphorus   


 


Magnesium   


 


Alkaline Phosphatase   


 


C-Reactive Protein   


 


Total Protein  5.9 L  


 


Albumin  3.8 L  














  09/29/22 09/29/22 09/29/22





  06:27 08:06 11:14


 


WBC   


 


RBC   


 


Hgb   


 


Hct   


 


MCV   


 


MCH   


 


MCHC   


 


Plt Count   


 


Seg Neuts % (Manual)   


 


Lymphocytes % (Manual)   


 


Seg Neutrophils # Man   


 


Lymphocytes # (Manual)   


 


ABG pH   


 


ABG pO2   


 


ABG HCO3   


 


ABG Base Excess   


 


ABG Hemoglobin   


 


VBG pH   


 


Sodium   


 


Potassium   


 


Chloride   


 


Carbon Dioxide   


 


BUN   


 


Creatinine   


 


Glucose   


 


POC Glucose  183 H  135 H  135 H


 


Calcium   


 


Phosphorus   


 


Magnesium   


 


Alkaline Phosphatase   


 


C-Reactive Protein   


 


Total Protein   


 


Albumin   














  09/29/22 09/29/22 09/29/22





  11:53 11:53 12:32


 


WBC   


 


RBC   


 


Hgb   


 


Hct   


 


MCV   


 


MCH   


 


MCHC   


 


Plt Count   


 


Seg Neuts % (Manual)   


 


Lymphocytes % (Manual)   


 


Seg Neutrophils # Man   


 


Lymphocytes # (Manual)   


 


ABG pH   


 


ABG pO2   


 


ABG HCO3   


 


ABG Base Excess   


 


ABG Hemoglobin   


 


VBG pH   


 


Sodium   


 


Potassium   


 


Chloride   


 


Carbon Dioxide  8 L*  


 


BUN   


 


Creatinine  1.6 H  


 


Glucose  207 H  


 


POC Glucose    230 H


 


Calcium   


 


Phosphorus  2.10 L D  


 


Magnesium   


 


Alkaline Phosphatase   


 


C-Reactive Protein   3.70 H 


 


Total Protein   


 


Albumin   














  09/29/22 09/29/22 09/29/22





  14:02 15:46 18:08


 


WBC   


 


RBC   


 


Hgb   


 


Hct   


 


MCV   


 


MCH   


 


MCHC   


 


Plt Count   


 


Seg Neuts % (Manual)   


 


Lymphocytes % (Manual)   


 


Seg Neutrophils # Man   


 


Lymphocytes # (Manual)   


 


ABG pH   


 


ABG pO2   


 


ABG HCO3   


 


ABG Base Excess   


 


ABG Hemoglobin   


 


VBG pH   


 


Sodium   


 


Potassium   


 


Chloride   


 


Carbon Dioxide   


 


BUN   


 


Creatinine   


 


Glucose   


 


POC Glucose  223 H  190 H  116 H


 


Calcium   


 


Phosphorus   


 


Magnesium   


 


Alkaline Phosphatase   


 


C-Reactive Protein   


 


Total Protein   


 


Albumin   














  09/29/22 09/29/22 09/29/22





  19:53 21:03 21:39


 


WBC   


 


RBC   


 


Hgb   


 


Hct   


 


MCV   


 


MCH   


 


MCHC   


 


Plt Count   


 


Seg Neuts % (Manual)   


 


Lymphocytes % (Manual)   


 


Seg Neutrophils # Man   


 


Lymphocytes # (Manual)   


 


ABG pH   


 


ABG pO2   


 


ABG HCO3   


 


ABG Base Excess   


 


ABG Hemoglobin   


 


VBG pH   


 


Sodium   


 


Potassium    3.5 L


 


Chloride    110.4 H


 


Carbon Dioxide    14 L


 


BUN    8 L


 


Creatinine    1.4 H


 


Glucose    138 H


 


POC Glucose  108 H  128 H 


 


Calcium   


 


Phosphorus    2.40 L


 


Magnesium    1.50 L


 


Alkaline Phosphatase   


 


C-Reactive Protein   


 


Total Protein   


 


Albumin   














  09/29/22 09/29/22 09/30/22





  22:03 23:05 00:21


 


WBC   


 


RBC   


 


Hgb   


 


Hct   


 


MCV   


 


MCH   


 


MCHC   


 


Plt Count   


 


Seg Neuts % (Manual)   


 


Lymphocytes % (Manual)   


 


Seg Neutrophils # Man   


 


Lymphocytes # (Manual)   


 


ABG pH   


 


ABG pO2   


 


ABG HCO3   


 


ABG Base Excess   


 


ABG Hemoglobin   


 


VBG pH   


 


Sodium   


 


Potassium   


 


Chloride   


 


Carbon Dioxide   


 


BUN   


 


Creatinine   


 


Glucose   


 


POC Glucose  153 H  139 H  125 H


 


Calcium   


 


Phosphorus   


 


Magnesium   


 


Alkaline Phosphatase   


 


C-Reactive Protein   


 


Total Protein   


 


Albumin   














  09/30/22 09/30/22 09/30/22





  00:26 01:06 02:08


 


WBC   


 


RBC   


 


Hgb   


 


Hct   


 


MCV   


 


MCH   


 


MCHC   


 


Plt Count   


 


Seg Neuts % (Manual)   


 


Lymphocytes % (Manual)   


 


Seg Neutrophils # Man   


 


Lymphocytes # (Manual)   


 


ABG pH   


 


ABG pO2   


 


ABG HCO3   


 


ABG Base Excess   


 


ABG Hemoglobin   


 


VBG pH   


 


Sodium   


 


Potassium  3.5 L  


 


Chloride  112.2 H  


 


Carbon Dioxide  14 L  


 


BUN  7 L  


 


Creatinine  1.4 H  


 


Glucose  108 H  


 


POC Glucose   108 H  117 H


 


Calcium  8.3 L  


 


Phosphorus   


 


Magnesium   


 


Alkaline Phosphatase   


 


C-Reactive Protein   


 


Total Protein   


 


Albumin   














  09/30/22 09/30/22 09/30/22





  03:06 04:12 04:20


 


WBC   


 


RBC   


 


Hgb    11.2 L


 


Hct    34.1 L D


 


MCV    83 L


 


MCH    27 L


 


MCHC   


 


Plt Count    127 L


 


Seg Neuts % (Manual)   


 


Lymphocytes % (Manual)   


 


Seg Neutrophils # Man   


 


Lymphocytes # (Manual)   


 


ABG pH   


 


ABG pO2   


 


ABG HCO3   


 


ABG Base Excess   


 


ABG Hemoglobin   


 


VBG pH   


 


Sodium   


 


Potassium   


 


Chloride   


 


Carbon Dioxide   


 


BUN   


 


Creatinine   


 


Glucose   


 


POC Glucose  142 H  160 H 


 


Calcium   


 


Phosphorus   


 


Magnesium   


 


Alkaline Phosphatase   


 


C-Reactive Protein   


 


Total Protein   


 


Albumin   














  09/30/22 09/30/22 09/30/22





  04:20 05:09 06:07


 


WBC   


 


RBC   


 


Hgb   


 


Hct   


 


MCV   


 


MCH   


 


MCHC   


 


Plt Count   


 


Seg Neuts % (Manual)   


 


Lymphocytes % (Manual)   


 


Seg Neutrophils # Man   


 


Lymphocytes # (Manual)   


 


ABG pH   


 


ABG pO2   


 


ABG HCO3   


 


ABG Base Excess   


 


ABG Hemoglobin   


 


VBG pH   


 


Sodium   


 


Potassium  3.3 L  


 


Chloride  109.5 H  


 


Carbon Dioxide  20 L  


 


BUN  7 L  


 


Creatinine  1.5 H  


 


Glucose  131 H  


 


POC Glucose   146 H  142 H


 


Calcium   


 


Phosphorus  1.70 L D  


 


Magnesium  1.60 L  


 


Alkaline Phosphatase   


 


C-Reactive Protein   


 


Total Protein   


 


Albumin   














  09/30/22





  07:13


 


WBC 


 


RBC 


 


Hgb 


 


Hct 


 


MCV 


 


MCH 


 


MCHC 


 


Plt Count 


 


Seg Neuts % (Manual) 


 


Lymphocytes % (Manual) 


 


Seg Neutrophils # Man 


 


Lymphocytes # (Manual) 


 


ABG pH 


 


ABG pO2 


 


ABG HCO3 


 


ABG Base Excess 


 


ABG Hemoglobin 


 


VBG pH 


 


Sodium 


 


Potassium 


 


Chloride 


 


Carbon Dioxide 


 


BUN 


 


Creatinine 


 


Glucose 


 


POC Glucose  139 H


 


Calcium 


 


Phosphorus 


 


Magnesium 


 


Alkaline Phosphatase 


 


C-Reactive Protein 


 


Total Protein 


 


Albumin 











Allied health notes reviewed: nursing

## 2022-09-30 NOTE — CONSULTATION
DATE OF CONSULTATION: 09/29/2022



PULMONARY CRITICAL CARE CONSULT NOTE



CONSULTING PHYSICIAN:  Dr. Talbot.



REASON FOR CONSULTATION:  Right lung nodule.



CHIEF COMPLAINT AND HISTORY OF PRESENT ILLNESS:  As follows:  The patient is a 

now 22-year-old male with past medical history significant for a diagnosis of 

diabetes, came into the Emergency Room, complaining of feeling weak, this was 

yesterday.  He said he had also been feeling sick for about 24 hours.  He did 

admits to being noncompliant with his insulin regimen.  He acknowledged nausea, 

multiple episodes of vomiting, shortness of breath, generalized weakness.  

Evaluation in the Emergency Room amongst other things shows that his O2 sats of 

89% on room air.  He was found to be in DKA.  He denied any new wounds or sores 

on his body.  He was admitted to the critical care unit for diabetic 

ketoacidosis, was deemed septic at that time.  CT scan of the chest was done and

it revealed right upper lobe nodular type infiltrate, hence the consult.  When I

stopped by to see him, he was resting in bed.  He remained on IV insulin at 5 

units per hour.  He was feeling better, though no longer nauseous, no more 

vomiting.  He denied abdominal pain.  He denied fevers or chills.  He denies any

history of tobacco use or abuse whatsoever.  He denies secondhand smoke 

exposure.  He denies any lumps, bumps or swellings on his body.  He denies any 

unexplained weight loss or gain.  He denies any focal weakness.  He denies any 

history of respiratory problems through his life.  This really is as much of the

history of presentation as I have.



PAST MEDICAL HISTORY:  Diabetes.



PAST SURGICAL HISTORY:  Denies.



MEDICATIONS:  He was on at the time I stopped by to see him, according to the 

medication administration record included the following:  Tylenol 650 mg p.o. q.

6 hours p.r.n. mild pain or fevers, albuterol 2.5 mg nebulized q. 3 hours p.r.n.

shortness of breath, Pepcid 20 mg IV daily, heparin 5000 units subcutaneous q. 

12 hours, Dilaudid 0.25 mg IV q. 4 hours p.r.n. moderate pain and 0.5 mg IV q. 

13 hours p.r.n. severe pain, insulin drip was going at 5 units per hour, 

Levaquin 750 mg IV daily, Percocet 1 tablet p.o. q. 6 hours p.r.n. moderate 

pain.  He received a bicarbonate drip supplementation and now is on a D5 half NS

with 20 mEq of KCl per liter run at 125 mL per hour per DKA protocol.  He has 

also received vancomycin 1 gram IV, he received a one-time dose.



ALLERGIES:  No known drug allergies.



DIET:  Well-built gentleman.  Denies acute weight loss or gain preceding few 

weeks to months.



FAMILY AND SOCIAL HISTORY:  Lives in the community.  Denies alcohol, tobacco or 

illicit drug use or abuse.



FAMILY HISTORY:  Positive for diabetes and hypertension.



REVIEW OF SYSTEMS:  No loss of consciousness.  No new onset seizures.  No new 

onset focal weakness.  He denied gross hematochezia or melena.  He denied gross 

hematuria or dysuria.  He did have the nausea, vomiting coming in as well as 

abdominal pain.  He denied heat or cold intolerance.  Denies polydipsia, 

polyuria.  Complete 13-system review of systems obtained.  Pertinent positives 

and/or negatives as in body of history above, otherwise noncontributory.



PHYSICAL EXAMINATION:

VITAL SIGNS:  He is afebrile, temperature 98.7 degrees Fahrenheit, pulse of 90, 

respiratory rate of 18, blood pressure was 179/124, O2 sats 100%, inspired 

oxygen concentration at that time was not recorded.  When I stopped by to see 

him, his O2 sats were 98% and that was on room air.

GENERAL:  He is a well-built young male.  Normocephalic, atraumatic.  Talking to

me in full sentences with normal respiratory effort at rest.

HEAD, EYES, EARS, NOSE AND THROAT:  Anicteric.  No conjunctival erythema.  

Oropharynx was moist.

NECK:  No gross jugular venous distention, no thyromegaly.  His neck was supple.

 Grossly, there were no palpable lymph nodes in the supraclavicular or 

submandibular lymph node chains.

LUNGS:  Auscultation of both lung fields unremarkable.  Good bilateral air 

movement.  No wheezing.

HEART:  Sounds 1 and 2 are heard, regular rate and rhythm at the time of my 

evaluation without overt rubs or murmurs.

ABDOMEN:  Soft, flat, bowel sounds are positive, nontender, no palpable 

hepatosplenomegaly.

EXTREMITIES:  Without overt digital clubbing or cyanosis, no pedal edema.  Pedal

pulses are 2+ bilaterally.

NEUROLOGIC:  Pupils are equal, round, about 4 mm, reactive to light.  

Extraocular muscle movements are intact.  He moves all 4 extremities 

spontaneously.

SKIN:  Normal turgor in the areas I examined without overt cellulitis or rash.  

Please see the wound care nurses' notes for full description of his skin.

PSYCHIATRIC:  Mood And Affect:  Mood was normal, affect was appropriate.  He had

intact judgment and insight.



LABORATORY DATA:  From my review are as follows:  Admission white cell count 

25,700, hemoglobin 13.4, hematocrit 46.4, platelet count 246, 3% band 

neutrophils.  Arterial blood gas at presentation showed a pH of 6.96, pCO2 of 12

and a pO2 of 139 that was on room air.  Serum sodium was 131, potassium 6.9, 

chloride 89, bicarb was 4, BUN 29, creatinine 2.2, glucose was 612.  Liver 

function test within normal limits.  Urinalysis was negative for nitrites and 

leukocyte esterase.  He was spilling glucose.  HIV test was nonreactive.  BUN is

15 today with a creatinine of 2.0, actually climbing in the wrong direction.  

Lactic acid level is now within normal limits.  Two sets of blood cultures are 

no growth to date.  I have reviewed the chest x-ray as well as the CT scan of 

the chest as well as the radiologist's report.  I do not see the centrilobular 

nodularities in the right lung apex and I do agree with a differential diagnosis

that this could well be an infectious process.  He has had bouts of aspiration, 

not the classic place for an aspiration infiltrate due to develop, but could 

also be related to that.  The rest of the pulmonary architecture looks pretty 

normal.



ASSESSMENT:



1. DKA

2. Abnormal CT chest in non smoker

3. Metabolic Acidosis



I will suggest that we treat empirically with Levaquin for possible aspiration 

pneumonia/community-acquired pneumonia.  He will need an outpatient pulmonary 

clinical followup and followup with his primary care physician for repeat 

imaging.  For now, we will keep him on the DKA protocol, keep him on IV insulin,

follow electrolytes and correct as necessary, volume resuscitating.  He is 

appropriately on GI prophylaxis as well as DVT prophylaxis.  Better medication 

compliance has been recommended. Continued tobacco abstinence has been 

counseled.  Flu and pneumonia vaccination will be addressed per protocol.



Thank you very much for the consult.  We will follow along and make further 

recommendations as picture progresses/becomes clearer.







DD: 09/29/2022 03:21 PM

DT: 09/30/2022 03:02 AM

TID: [id number] RECEIPT: [id number]

ISMAEL/ADELINA

MTDD